# Patient Record
Sex: MALE | Race: WHITE | NOT HISPANIC OR LATINO | Employment: OTHER | URBAN - METROPOLITAN AREA
[De-identification: names, ages, dates, MRNs, and addresses within clinical notes are randomized per-mention and may not be internally consistent; named-entity substitution may affect disease eponyms.]

---

## 2017-05-01 ENCOUNTER — OFFICE VISIT (OUTPATIENT)
Dept: FAMILY MEDICINE | Facility: CLINIC | Age: 67
End: 2017-05-01
Payer: COMMERCIAL

## 2017-05-01 VITALS
WEIGHT: 229 LBS | SYSTOLIC BLOOD PRESSURE: 136 MMHG | BODY MASS INDEX: 31.02 KG/M2 | HEART RATE: 73 BPM | DIASTOLIC BLOOD PRESSURE: 80 MMHG | HEIGHT: 72 IN | TEMPERATURE: 98 F | OXYGEN SATURATION: 100 %

## 2017-05-01 DIAGNOSIS — I10 ESSENTIAL HYPERTENSION WITH GOAL BLOOD PRESSURE LESS THAN 140/90: ICD-10-CM

## 2017-05-01 DIAGNOSIS — Z13.220 SCREENING CHOLESTEROL LEVEL: ICD-10-CM

## 2017-05-01 DIAGNOSIS — Z12.11 SPECIAL SCREENING FOR MALIGNANT NEOPLASMS, COLON: ICD-10-CM

## 2017-05-01 DIAGNOSIS — R97.20 ELEVATED PSA: Primary | ICD-10-CM

## 2017-05-01 LAB
ANION GAP SERPL CALCULATED.3IONS-SCNC: 7 MMOL/L (ref 3–14)
BUN SERPL-MCNC: 15 MG/DL (ref 7–30)
CALCIUM SERPL-MCNC: 9 MG/DL (ref 8.5–10.1)
CHLORIDE SERPL-SCNC: 108 MMOL/L (ref 94–109)
CHOLEST SERPL-MCNC: 175 MG/DL
CO2 SERPL-SCNC: 27 MMOL/L (ref 20–32)
CREAT SERPL-MCNC: 1.09 MG/DL (ref 0.66–1.25)
GFR SERPL CREATININE-BSD FRML MDRD: 67 ML/MIN/1.7M2
GLUCOSE SERPL-MCNC: 100 MG/DL (ref 70–99)
HDLC SERPL-MCNC: 48 MG/DL
LDLC SERPL CALC-MCNC: 106 MG/DL
NONHDLC SERPL-MCNC: 127 MG/DL
POTASSIUM SERPL-SCNC: 4.2 MMOL/L (ref 3.4–5.3)
PSA SERPL-MCNC: 5.05 UG/L (ref 0–4)
SODIUM SERPL-SCNC: 142 MMOL/L (ref 133–144)
TRIGL SERPL-MCNC: 103 MG/DL

## 2017-05-01 PROCEDURE — 36415 COLL VENOUS BLD VENIPUNCTURE: CPT | Performed by: FAMILY MEDICINE

## 2017-05-01 PROCEDURE — 99214 OFFICE O/P EST MOD 30 MIN: CPT | Performed by: FAMILY MEDICINE

## 2017-05-01 PROCEDURE — 84153 ASSAY OF PSA TOTAL: CPT | Performed by: FAMILY MEDICINE

## 2017-05-01 PROCEDURE — 80061 LIPID PANEL: CPT | Performed by: FAMILY MEDICINE

## 2017-05-01 PROCEDURE — 80048 BASIC METABOLIC PNL TOTAL CA: CPT | Performed by: FAMILY MEDICINE

## 2017-05-01 RX ORDER — AMLODIPINE BESYLATE 10 MG/1
10 TABLET ORAL DAILY
Qty: 90 TABLET | Refills: 1 | Status: SHIPPED | OUTPATIENT
Start: 2017-05-01 | End: 2017-11-30

## 2017-05-01 NOTE — NURSING NOTE
"Chief Complaint   Patient presents with     Hypertension       Initial /86 (Cuff Size: Adult Large)  Pulse 73  Temp 98  F (36.7  C) (Oral)  Ht 6' 0.25\" (1.835 m)  Wt 229 lb (103.9 kg)  SpO2 100%  BMI 30.84 kg/m2 Estimated body mass index is 30.84 kg/(m^2) as calculated from the following:    Height as of this encounter: 6' 0.25\" (1.835 m).    Weight as of this encounter: 229 lb (103.9 kg).  Medication Reconciliation: complete    Lauren Rod LPN    "

## 2017-05-01 NOTE — PATIENT INSTRUCTIONS
1. Set up the colonoscopy by calling [phone].    2. Let me see you back in 6 months for a physical with fasting labs.

## 2017-05-01 NOTE — PROGRESS NOTES
HPI:    Duane is a 65 year old male here to discuss:    HTN - Dx'd around: 2010. BP checks outside the clinic: about once per week - around 130/70  Treatment:    Norvasc 10 mg qd - no side effects   Lisinopril, HCTZ, Losartan caused cough and dry skin.  Compliant: yes  Side effects: trace ankle swelling but tolerable  Controlled: yes  Plan for today: Continue Norvasc 10 mg qPM.   Counseling done today: Advised to avoid excessive salt. Regular exercise, following the DASH diet and trying to achieve healthy weight are all important.    Last Basic Metabolic Panel:  NA      141   1/20/2016   POTASSIUM      4.2   1/20/2016  CHLORIDE      108   1/20/2016  JUSTIN      8.6   1/20/2016  CO2       28   1/20/2016  BUN       14   1/20/2016  CR     1.15   1/20/2016  GLC       97   1/20/2016    CBC RESULTS:   Recent Labs   Lab Test  01/20/16   0947   WBC  6.9   RBC  4.81   HGB  14.5   HCT  41.7   MCV  87   MCH  30.1   MCHC  34.8   RDW  12.8   PLT  209     Obesity - declined referral to nutrition. Diet and exercise discussed.    TSH     Date   Value   Ref Range   Status     2/3/2015   1.56    0.40 - 4.00 mU/L   Final     Effective 7/30/2014, the reference range for this assay has changed to reflect  new instrumentation/methodology.      Wt Readings from Last 5 Encounters:   05/01/17 229 lb (103.9 kg)   07/26/16 223 lb (101.2 kg)   02/17/16 220 lb (99.8 kg)   01/20/16 217 lb (98.4 kg)   07/29/15 219 lb (99.3 kg)     Chronic nasal obstruction - he tells me he saw an ENT doctor many years ago and was told all was well.      Elevated PSA - recheck today.    PSA   Date Value Ref Range Status   07/26/2016 4.26 (H) 0 - 4 ug/L Final     Preventive -     Declines flu shot, Tdap, Prevnar, Shingles and Pneumovax.    Lipids screen:    Recent Labs   Lab Test  01/20/16   0947  02/03/15   0755  07/01/13   0937   CHOL  153  152  154   HDL  39*  37*  37*   LDL  82  91  97   TRIG  159*  120  101   CHOLHDLRATIO   --   4.1  4.1     Colonoscopy: 6/5/07  "normal per patient.    Advanced direction: referred previously    ROS:    Const: No fevers or night sweats recently.  ENT: No sore throat or ear pain.  Resp: No cough or shortness of breath.  CV: No chest pain, dizziness or cardiac palpitations.  GI: No nausea, vomiting, diarrhea or constipation. Denies blood in stools or black stools.  : No dysuria, frequency or hematuria.    SH:    Marital status:   Kids: 4  Employment: retired  Exercise: walks about 2 miles every other day. Not going to Gym any more. But golfing 4-5 per week.   Tobacco: no  Etoh: 3-4 per week  Recreational drugs: no  Caffeine: coffee 2 cups per day.    Exam:    /80  Pulse 73  Temp 98  F (36.7  C) (Oral)  Ht 6' 0.25\" (1.835 m)  Wt 229 lb (103.9 kg)  SpO2 100%  BMI 30.84 kg/m2    Gen: Healthy appearing male in no acute distress  Eyes: Conjunctiva and sclera normal. Pupils react normally to light. No nystagmus.  Neck: No enlarged lymph nodes, thyromegally or other masses.  Lungs: Good air movement and otherwise clear.  CV: Heart RRR with no murmurs. No JVD, carotid bruits or leg edema.      Assessment and Plan - Decision Making    1. Elevated PSA  Repeat today.  - PSA, tumor marker    2. Essential hypertension with goal blood pressure less than 140/90  Controlled. Continue same treatment. Check fasting labs.  - Basic metabolic panel  - amLODIPine (NORVASC) 10 MG tablet; Take 1 tablet (10 mg) by mouth daily  Dispense: 90 tablet; Refill: 1    3. Screening cholesterol level  Check fasting labs.  - Lipid panel reflex to direct LDL    4. Special screening for malignant neoplasms, colon  Referred.  - GASTROENTEROLOGY ADULT REF PROCEDURE ONLY      Written instructions given as follows:    Patient Instructions   1. Set up the colonoscopy by calling [phone].    2. Let me see you back in 6 months for a physical with fasting labs.      "

## 2017-05-01 NOTE — MR AVS SNAPSHOT
"              After Visit Summary   5/1/2017    Duane T Nienow    MRN: [id number]           Patient Information     Date Of Birth          1950        Visit Information        Provider Department      5/1/2017 8:50 AM Srinivasa Berumen MD [facility]        Today's Diagnoses     Elevated PSA    -  1    Essential hypertension with goal blood pressure less than 140/90        Screening cholesterol level        Special screening for malignant neoplasms, colon          Care Instructions    1. Set up the colonoscopy by calling [phone].    2. Let me see you back in 6 months for a physical with fasting labs.        Follow-ups after your visit        Additional Services     GASTROENTEROLOGY ADULT REF PROCEDURE ONLY                 Who to contact     If you have questions or need follow up information about today's clinic visit or your schedule please contact [facility] directly at [phone].  Normal or non-critical lab and imaging results will be communicated to you by Knginehart, letter or phone within 4 business days after the clinic has received the results. If you do not hear from us within 7 days, please contact the clinic through Knginehart or phone. If you have a critical or abnormal lab result, we will notify you by phone as soon as possible.  Submit refill requests through [employer] or call your pharmacy and they will forward the refill request to us. Please allow 3 business days for your refill to be completed.          Additional Information About Your Visit        MyChart Information     [employer] lets you send messages to your doctor, view your test results, renew your prescriptions, schedule appointments and more. To sign up, go to [email or web address] . Click on \"Log in\" on the left side of the screen, which will take you to the Welcome page. Then click on \"Sign up Now\" on the right side of the page.     You will be asked to enter the access code listed below, as well as some " "personal information. Please follow the directions to create your username and password.     Your access code is: [email or web address]  Expires: 2017  9:37 AM     Your access code will  in 90 days. If you need help or a new code, please call your [place] clinic or [phone].        Care EveryWhere ID     This is your Care EveryWhere ID. This could be used by other organizations to access your [facility] medical records  [id number]        Your Vitals Were     Pulse Temperature Height Pulse Oximetry BMI (Body Mass Index)       73 98  F (36.7  C) (Oral) 6' 0.25\" (1.835 m) 100% 30.84 kg/m2        Blood Pressure from Last 3 Encounters:   17 136/80   16 136/79   16 129/82    Weight from Last 3 Encounters:   17 229 lb (103.9 kg)   16 223 lb (101.2 kg)   16 220 lb (99.8 kg)              We Performed the Following     Basic metabolic panel     GASTROENTEROLOGY ADULT REF PROCEDURE ONLY     Lipid panel reflex to direct LDL     PSA, tumor marker          Where to get your medicines      These medications were sent to [address]  [id number] [place] [address]     Phone:  [phone]     amLODIPine 10 MG tablet          Primary Care Provider Office Phone # Fax #    Srinivasa Berumen [phone]       [address] [address]        Thank you!     Thank you for choosing [facility]  for your care. Our goal is always to provide you with excellent care. Hearing back from our patients is one way we can continue to improve our services. Please take a few minutes to complete the written survey that you may receive in the mail after your visit with us. Thank you!             Your Updated Medication List - Protect others around you: Learn how to safely use, store and throw away your medicines at www.disposemymeds.org.          This list is accurate as of: " 5/1/17  9:37 AM.  Always use your most recent med list.                   Brand Name Dispense Instructions for use    amLODIPine 10 MG tablet    NORVASC    90 tablet    Take 1 tablet (10 mg) by mouth daily       aspirin 81 MG tablet      Take  by mouth daily.       MULTI-VITAMIN DAILY PO      Take  by mouth.

## 2017-05-03 ENCOUNTER — TELEPHONE (OUTPATIENT)
Dept: FAMILY MEDICINE | Facility: CLINIC | Age: 67
End: 2017-05-03

## 2017-05-03 DIAGNOSIS — E78.5 DYSLIPIDEMIA: ICD-10-CM

## 2017-05-03 DIAGNOSIS — R97.20 ELEVATED PSA: Primary | ICD-10-CM

## 2017-05-03 RX ORDER — ATORVASTATIN CALCIUM 20 MG/1
20 TABLET, FILM COATED ORAL DAILY
Qty: 90 TABLET | Refills: 3 | Status: SHIPPED | OUTPATIENT
Start: 2017-05-03 | End: 2017-10-31

## 2017-05-03 NOTE — TELEPHONE ENCOUNTER
Notes Recorded by Srinivasa Berumen MD on 5/3/2017 at 3:53 PM  Hello Duane,    Your cholesterol was a bit high. Your estimated 10 year risk of a heart attack or stroke is 17.7%. Statin drugs are recommended at 7.5% or greater.     Therefore, I would like you to start Lipitor 20 mg daily (sent to your pharmacy). Possible side effects include liver enzyme elevation, muscle aches. Increase in glucose and memory problems have been reported. But I believe the benefits outweigh the risks.    I recommend including veggies, fresh fruits (3 to 5 servings), nuts (unsalted) in your daily diet. Cut down on carbs like bread, pasta, rice, potatoes. Cut down on red meats like burgers etc. Increase healthy proteins like beans, tofu, tuna, chicken and turkey.    Get regular exercise like jogging, biking, swimming at least 3 times per week.     Your PSA was slightly higher. I am referring you to urology - please set this up by calling [phone].    Other labs were fine.    See you in 6 months as we discussed in clinic.    Regards,    Srinivasa Berumen M.D.

## 2017-05-03 NOTE — PROGRESS NOTES
Hello Duane,    Your cholesterol was a bit high. Your estimated 10 year risk of a heart attack or stroke is 17.7%. Statin drugs are recommended at 7.5% or greater.     Therefore, I would like you to start Lipitor 20 mg daily (sent to your pharmacy). Possible side effects include liver enzyme elevation, muscle aches. Increase in glucose and memory problems have been reported. But I believe the benefits outweigh the risks.    I recommend including veggies, fresh fruits (3 to 5 servings), nuts (unsalted) in your daily diet. Cut down on carbs like bread, pasta, rice, potatoes. Cut down on red meats like burgers etc. Increase healthy proteins like beans, tofu, tuna, chicken and turkey.    Get regular exercise like jogging, biking, swimming at least 3 times per week.     Your PSA was slightly higher. I am referring you to urology - please set this up by calling [phone].    Other labs were fine.    See you in 6 months as we discussed in clinic.    Regards,    Srinivasa Berumen M.D.

## 2017-05-03 NOTE — LETTER
[facility]  [address] [facility] [address] [phone]        May 4, 2017    Duane T Nienow  [address] [facility] [address]            Denise Aliciaane,    Your cholesterol was a bit high. Your estimated 10 year risk of a heart attack or stroke is 17.7%. Statin drugs are recommended at 7.5% or greater.     Therefore, I would like you to start Lipitor 20 mg daily (sent to your pharmacy). Possible side effects include liver enzyme elevation, muscle aches. Increase in glucose and memory problems have been reported. But I believe the benefits outweigh the risks.    I recommend including veggies, fresh fruits (3 to 5 servings), nuts (unsalted) in your daily diet. Cut down on carbs like bread, pasta, rice, potatoes. Cut down on red meats like burgers etc. Increase healthy proteins like beans, tofu, tuna, chicken and turkey.    Get regular exercise like jogging, biking, swimming at least 3 times per week.     Your PSA was slightly higher. I am referring you to urology - please set this up by calling [phone].    Other labs were fine.    See you in 6 months as we discussed in clinic.    Regards,    Srinivasa Berumen M.D.    Results for orders placed or performed in visit on 05/01/17   Lipid panel reflex to direct LDL   Result Value Ref Range    Cholesterol 175 <200 mg/dL    Triglycerides 103 <150 mg/dL    HDL Cholesterol 48 >39 mg/dL    LDL Cholesterol Calculated 106 (H) <100 mg/dL    Non HDL Cholesterol 127 <130 mg/dL   PSA, tumor marker   Result Value Ref Range    PSA 5.05 (H) 0 - 4 ug/L   Basic metabolic panel   Result Value Ref Range    Sodium 142 133 - 144 mmol/L    Potassium 4.2 3.4 - 5.3 mmol/L    Chloride 108 94 - 109 mmol/L    Carbon Dioxide 27 20 - 32 mmol/L    Anion Gap 7 3 - 14 mmol/L    Glucose 100 (H) 70 - 99 mg/dL    Urea Nitrogen 15 7 - 30 mg/dL    Creatinine 1.09 0.66 - 1.25 mg/dL    GFR Estimate 67 >60 mL/min/1.7m2    GFR Estimate If Black 82 >60 mL/min/1.7m2    Calcium  9.0 8.5 - 10.1 mg/dL

## 2017-05-04 NOTE — TELEPHONE ENCOUNTER
Pt notified of provider message as written.  Pt verbalized good understanding.  Pt requesting paper copy of results and note be mailed to his home.  Mckenzie Patel RN

## 2017-09-07 ENCOUNTER — TELEPHONE (OUTPATIENT)
Dept: FAMILY MEDICINE | Facility: CLINIC | Age: 67
End: 2017-09-07

## 2017-09-07 NOTE — LETTER
Duane T Nienow                                                                [address]  [place] [address]              September 7, 2017             Our records indicate that you have not scheduled for a(n)appointment with  Srinivasa Berumen MD and fasting bloodwork one week prior to the appointment with the provider which was recommended by your health care team. Monitoring and managing your preventative and chronic health conditions are very important to us.     Set up the colonoscopy by calling [phone].    Please call [phone] or message us through your Sc[employer]led[employer] account to schedule an appointment or provide information for your chart.     If you are receiving any of these services at another site please bring in a copy at your next visit so we can get it scanned into your chart.     I look forward to seeing you and working with you on your health care needs.     Sincerely,       Srinivasa Berumen MD/sw

## 2017-09-07 NOTE — TELEPHONE ENCOUNTER
Panel Management Review      Patient has the following on his problem list:     Hypertension   Last three blood pressure readings:  BP Readings from Last 3 Encounters:   05/01/17 136/80   07/26/16 136/79   02/17/16 129/82     Blood pressure: Passed    HTN Guidelines:  Age 18-59 BP range:  Less than 140/90  Age 60-85 with Diabetes:  Less than 140/90  Age 60-85 without Diabetes:  less than 150/90        Composite cancer screening  Chart review shows that this patient is due/due soon for the following Colonoscopy  Summary:    Patient is due/failing the following:   COLONOSCOPY    Action needed:   Patient needs office visit for a physical with fasting labs.    Type of outreach:    Sent letter.    Questions for provider review:    None                                                                                                                                    Lauren Rod LPN       Chart routed to Care Team .

## 2017-10-31 ENCOUNTER — OFFICE VISIT (OUTPATIENT)
Dept: UROLOGY | Facility: CLINIC | Age: 67
End: 2017-10-31
Payer: COMMERCIAL

## 2017-10-31 VITALS — DIASTOLIC BLOOD PRESSURE: 88 MMHG | SYSTOLIC BLOOD PRESSURE: 132 MMHG | RESPIRATION RATE: 14 BRPM | HEART RATE: 77 BPM

## 2017-10-31 DIAGNOSIS — R97.20 ELEVATED PSA: Primary | ICD-10-CM

## 2017-10-31 PROCEDURE — 87070 CULTURE OTHR SPECIMN AEROBIC: CPT | Performed by: UROLOGY

## 2017-10-31 PROCEDURE — 87077 CULTURE AEROBIC IDENTIFY: CPT | Performed by: UROLOGY

## 2017-10-31 PROCEDURE — 87186 SC STD MICRODIL/AGAR DIL: CPT | Performed by: UROLOGY

## 2017-10-31 PROCEDURE — 99203 OFFICE O/P NEW LOW 30 MIN: CPT | Performed by: UROLOGY

## 2017-10-31 NOTE — PATIENT INSTRUCTIONS
Prostate Needle Biopsy    Prostate needle biopsy is a test to look for prostate cancer. During the test, a thin, hollow needle is used to take small samples of tissue from the prostate. The samples are then tested in a lab.  Getting ready for the procedure  Prepare as you have been told. In addition to the following:    Tell your healthcare provider about all medicines you take. This includes herbs and other supplements. It also includes any blood thinners, such as warfarin, clopidogrel, or daily aspirin. You may need to stop taking some or all of them before the procedure.    You may be told to use a laxative, enemas, or both before the biopsy. This is to empty the colon and rectum of stool. Follow the instructions you are given.    Your healthcare provider may prescribe antibiotics before the procedure. If so, take these as directed.  Risks and possible complications   All procedures have risks. The risks of this procedure include:    Discomfort in the prostate area    Infection in the urinary tract or prostate    Infection in the bloodstream    Rectal or urinary bleeding   The day of the procedure  The procedure is done in a healthcare provider s office or a hospital. It takes about 45 minutes. You will be able to go home the same day. Transrectal ultrasound is often used during the procedure. This test uses sound waves to make images on a computer screen. The images help the healthcare provider insert the needle in the correct place. During the biopsy:    If ultrasound will be used, you may be asked to drink water to fill your bladder.      You may lie on your side on an exam table.     The ultrasound transducer, which is about the size of a finger, is lubricated. It is then inserted into the rectum. This will feel like a prostate exam. The transducer is moved until the prostate can be seen in the ultrasound images.      To numb the biopsy area, local anesthetics may be injected. You might also be given  medicine to make you sleepy.    Using the ultrasound images as a guide, the biopsy needle is inserted. It may be inserted through the rectum or through the skin between the scrotum and the anus (perineum).    The needle is used to take tissue samples from the prostate. About 12 samples are taken from different areas of the prostate. These samples are sent to a lab to be tested for cancer.  After the biopsy  At first you may feel a little lightheaded, especially if you had medicine to make you sleepy. You can lie on the table until you feel able to stand. You can go home once you are feeling better. You can go back to your normal activities. You may have some blood in your urine or stool that day. This is normal. You may also notice blood in your semen for weeks after the biopsy. This is normal and not dangerous. Your healthcare provider can tell you more about what to expect.  Follow-up care  You will see your healthcare provider for a follow-up visit. Depending on the biopsy results, you may be scheduled for more tests. If signs of cancer are found, you and your healthcare provider can discuss options for further testing.  When to call your healthcare provider  Call your healthcare provider if you have any of the following:    Blood clots in your urine    Bloody diarrhea    Blood in the urine or stool that doesn t go away after 48 hours    Chest pain or trouble breathing (call 911)    Chills    Feeling of weakness    Fever of 100.4 F (38 C) or higher, or as directed by your healthcare provider    Inability to urinate   Date Last Reviewed: 5/1/2017 2000-2017 [employer]. [address] [place] [address], [place] [address]. All rights reserved. This information is not intended as a substitute for professional medical care. Always follow your healthcare professional's instructions.

## 2017-10-31 NOTE — PROGRESS NOTES
S: Duane T Nienow is a pleasant  66 year old male who was requested to be seen by  Srinivasa Berumen for a consult with regard to patient's elevated PSA.  His recent PSA was found to be   PSA   Date Value Ref Range Status   05/01/2017 5.05 (H) 0 - 4 ug/L Final     Comment:     Assay Method:  Chemiluminescence using Siemens Vista analyzer   .  His previous PSA was abnormal in 2016 also.  Patient complains of minimal voiding symptoms.  He has no history of elevated PSA.  His AUA Symptom Score:  low.  Current Outpatient Prescriptions   Medication Sig Dispense Refill     amLODIPine (NORVASC) 10 MG tablet Take 1 tablet (10 mg) by mouth daily 90 tablet 1     aspirin 81 MG tablet Take  by mouth daily.       Multiple Vitamin (MULTI-VITAMIN DAILY PO) Take  by mouth.        No Known Allergies   Past Medical History:   Diagnosis Date     Hypertension      Past Surgical History:   Procedure Laterality Date     ORTHOPEDIC SURGERY      michell. bunionectomy      Family History   Problem Relation Age of Onset     CEREBROVASCULAR DISEASE Father      HEART DISEASE Father      MI age 49     He does not have a family history of prostate cancer.  Social History     Social History     Marital status:      Spouse name: N/A     Number of children: N/A     Years of education: N/A     Occupational History     Not on file.     Social History Main Topics     Smoking status: Former Smoker     Quit date: 6/5/1972     Smokeless tobacco: Never Used     Alcohol use Yes      Comment: 3 drinks a week     Drug use: No     Sexual activity: Yes     Other Topics Concern     Not on file     Social History Narrative        REVIEW OF SYSTEMS  =================  C: NEGATIVE for fever, chills, change in weight  I: NEGATIVE for worrisome rashes, moles or lesions  E/M: NEGATIVE for ear, mouth and throat problems  R: NEGATIVE for significant cough or SHORTNESS OF BREATH  CV:  NEGATIVE for chest pain, palpitations or peripheral edema  GI: NEGATIVE for nausea,  abdominal pain, heartburn, or change in bowel habits  NEURO: NEGATIVE  PSYCH: NEGATIVE    Physical Exam:  /88 (BP Location: Right arm, Patient Position: Chair, Cuff Size: Adult Regular)  Pulse 77  Resp 14   Patient is pleasant, in no acute distress, good general condition.  HEENT normocephalic, atraumatic  Lung: no evidence of respiratory distress    Abdomen: Soft, nondistended, non tender. No masses. No rebound or guarding.   Exam: penis no d/c.  Left testis small.  Right missing.  Prostate 30 gm smooth no nodule.  Skin: Warm and dry.  No redness.  Neuro non focal  Psych normal mood and affect    Assessment/Plan:   (R97.20) Elevated PSA  (primary encounter diagnosis)  Comment: discussed psa elevation/prostate cancer  Plan: schedule for trus and biopsy            Risks of bleeding/infection discussed.

## 2017-10-31 NOTE — NURSING NOTE
"Chief Complaint   Patient presents with     Elevated PSA       Initial /88 (BP Location: Right arm, Patient Position: Chair, Cuff Size: Adult Regular)  Pulse 77  Resp 14 Estimated body mass index is 30.84 kg/(m^2) as calculated from the following:    Height as of 5/1/17: 1.835 m (6' 0.25\").    Weight as of 5/1/17: 103.9 kg (229 lb).  Medication Reconciliation: complete   Ladi Cosme RN       "

## 2017-10-31 NOTE — MR AVS SNAPSHOT
After Visit Summary   10/31/2017    Duane T Nienow    MRN: [id number]           Patient Information     Date Of Birth          1950        Visit Information        Provider Department      10/31/2017 8:15 AM Andrea Bonner MD [facility]        Today's Diagnoses     Elevated PSA    -  1      Care Instructions      Prostate Needle Biopsy    Prostate needle biopsy is a test to look for prostate cancer. During the test, a thin, hollow needle is used to take small samples of tissue from the prostate. The samples are then tested in a lab.  Getting ready for the procedure  Prepare as you have been told. In addition to the following:    Tell your healthcare provider about all medicines you take. This includes herbs and other supplements. It also includes any blood thinners, such as warfarin, clopidogrel, or daily aspirin. You may need to stop taking some or all of them before the procedure.    You may be told to use a laxative, enemas, or both before the biopsy. This is to empty the colon and rectum of stool. Follow the instructions you are given.    Your healthcare provider may prescribe antibiotics before the procedure. If so, take these as directed.  Risks and possible complications   All procedures have risks. The risks of this procedure include:    Discomfort in the prostate area    Infection in the urinary tract or prostate    Infection in the bloodstream    Rectal or urinary bleeding   The day of the procedure  The procedure is done in a healthcare provider s office or a hospital. It takes about 45 minutes. You will be able to go home the same day. Transrectal ultrasound is often used during the procedure. This test uses sound waves to make images on a computer screen. The images help the healthcare provider insert the needle in the correct place. During the biopsy:    If ultrasound will be used, you may be asked to drink water to fill your bladder.      You may lie on your side  on an exam table.     The ultrasound transducer, which is about the size of a finger, is lubricated. It is then inserted into the rectum. This will feel like a prostate exam. The transducer is moved until the prostate can be seen in the ultrasound images.      To numb the biopsy area, local anesthetics may be injected. You might also be given medicine to make you sleepy.    Using the ultrasound images as a guide, the biopsy needle is inserted. It may be inserted through the rectum or through the skin between the scrotum and the anus (perineum).    The needle is used to take tissue samples from the prostate. About 12 samples are taken from different areas of the prostate. These samples are sent to a lab to be tested for cancer.  After the biopsy  At first you may feel a little lightheaded, especially if you had medicine to make you sleepy. You can lie on the table until you feel able to stand. You can go home once you are feeling better. You can go back to your normal activities. You may have some blood in your urine or stool that day. This is normal. You may also notice blood in your semen for weeks after the biopsy. This is normal and not dangerous. Your healthcare provider can tell you more about what to expect.  Follow-up care  You will see your healthcare provider for a follow-up visit. Depending on the biopsy results, you may be scheduled for more tests. If signs of cancer are found, you and your healthcare provider can discuss options for further testing.  When to call your healthcare provider  Call your healthcare provider if you have any of the following:    Blood clots in your urine    Bloody diarrhea    Blood in the urine or stool that doesn t go away after 48 hours    Chest pain or trouble breathing (call 911)    Chills    Feeling of weakness    Fever of 100.4 F (38 C) or higher, or as directed by your healthcare provider    Inability to urinate   Date Last Reviewed: 5/1/2017 2000-2017 [facility]  [employer]. [address] [place] [address]. All rights reserved. This information is not intended as a substitute for professional medical care. Always follow your healthcare professional's instructions.                Follow-ups after your visit        Your next 10 appointments already scheduled     Nov 07, 2017   Procedure with Graham Cerda MD   [facility] [place] (--)    [address] [place] [address]   [phone]            Nov 21, 2017  8:00 AM CST   US PROSTATE WITH BIOPSY with FKUS1   [facility] ([facility])    [address] [place] [address] [phone]           Please bring a list of your medicines (including vitamins, minerals and over-the-counter drugs). Also, tell your doctor about any allergies you may have. Wear comfortable clothes and leave your valuables at home.  Take a Fleet enema two hours before your exam. Buy this at the drug store. Follow the instructions on the box.  Please bring or send the results of your most recent PSA test, along with the written report from your last rectal or prostate exam.  Please call the Imaging Department at your exam site with any questions.            Nov 21, 2017  8:15 AM CST   Return Visit with Andrea Bonner MD   [facility] ([facility])    [address] [place] [address]   [phone]            Nov 28, 2017  8:00 AM CST   Return Visit with Andrea Bonner MD   [facility] ([facility])    [address] [place] [address]   [phone]              Who to contact     If you have questions or need follow up information about today's clinic visit or your schedule please contact [facility] directly at [phone].  Normal or non-critical lab and imaging results will be communicated to you by MyChart, letter or phone within 4 business days after the clinic has received  "the results. If you do not hear from us within 7 days, please contact the clinic through [employer] or phone. If you have a critical or abnormal lab result, we will notify you by phone as soon as possible.  Submit refill requests through [employer] or call your pharmacy and they will forward the refill request to us. Please allow 3 business days for your refill to be completed.          Additional Information About Your Visit        [employer] Information     [employer] lets you send messages to your doctor, view your test results, renew your prescriptions, schedule appointments and more. To sign up, go to [email or web address] . Click on \"Log in\" on the left side of the screen, which will take you to the Welcome page. Then click on \"Sign up Now\" on the right side of the page.     You will be asked to enter the access code listed below, as well as some personal information. Please follow the directions to create your username and password.     Your access code is: [email or web address]  Expires: 2018  8:31 AM     Your access code will  in 90 days. If you need help or a new code, please call your [place] clinic or [phone].        Care EveryWhere ID     This is your Care EveryWhere ID. This could be used by other organizations to access your [place] medical records  [id number]        Your Vitals Were     Pulse Respirations                77 14           Blood Pressure from Last 3 Encounters:   10/31/17 132/88   17 136/80   16 136/79    Weight from Last 3 Encounters:   17 103.9 kg (229 lb)   16 101.2 kg (223 lb)   16 99.8 kg (220 lb)              Today, you had the following     No orders found for display       Primary Care Provider Office Phone # Fax #    Srinivasa Berumen [phone] [address] [facility] [address]        Equal Access to Services     SHELLY CHRISTIAN : Hadii wilder Rhoades, waaxda luqadaha, qaybta jordanalaicha vail, benji juarez " unique brandtbettinajose juarezSuhailaaplacido ah. So [facility] [phone].    ATENCIÓN: Si habla alex, tiene a coto disposición servicios gratuitos de asistencia lingüística. Mikala al [phone].    We comply with applicable federal civil rights laws and [place] laws. We do not discriminate on the basis of race, color, national origin, age, disability, sex, sexual orientation, or gender identity.            Thank you!     Thank you for choosing [facility]  for your care. Our goal is always to provide you with excellent care. Hearing back from our patients is one way we can continue to improve our services. Please take a few minutes to complete the written survey that you may receive in the mail after your visit with us. Thank you!             Your Updated Medication List - Protect others around you: Learn how to safely use, store and throw away your medicines at www.disposemymeds.org.          This list is accurate as of: 10/31/17  8:42 AM.  Always use your most recent med list.                   Brand Name Dispense Instructions for use Diagnosis    amLODIPine 10 MG tablet    NORVASC    90 tablet    Take 1 tablet (10 mg) by mouth daily    Essential hypertension with goal blood pressure less than 140/90       aspirin 81 MG tablet      Take  by mouth daily.        MULTI-VITAMIN DAILY PO      Take  by mouth.

## 2017-11-03 DIAGNOSIS — R97.20 ELEVATED PSA: Primary | ICD-10-CM

## 2017-11-03 LAB
BACTERIA SPEC CULT: ABNORMAL
Lab: ABNORMAL
SPECIMEN SOURCE: ABNORMAL

## 2017-11-03 RX ORDER — CIPROFLOXACIN 500 MG/1
500 TABLET, FILM COATED ORAL 2 TIMES DAILY
Qty: 6 TABLET | Refills: 0 | Status: SHIPPED | OUTPATIENT
Start: 2017-11-03 | End: 2017-11-06

## 2017-11-03 RX ORDER — CEPHALEXIN 500 MG/1
500 CAPSULE ORAL 3 TIMES DAILY
Qty: 9 CAPSULE | Refills: 0 | Status: SHIPPED | OUTPATIENT
Start: 2017-11-03 | End: 2017-11-06

## 2017-11-07 ENCOUNTER — SURGERY (OUTPATIENT)
Age: 67
End: 2017-11-07

## 2017-11-07 ENCOUNTER — HOSPITAL ENCOUNTER (OUTPATIENT)
Facility: AMBULATORY SURGERY CENTER | Age: 67
Discharge: HOME | End: 2017-11-07
Attending: SURGERY | Admitting: SURGERY
Payer: COMMERCIAL

## 2017-11-07 VITALS
TEMPERATURE: 98.8 F | RESPIRATION RATE: 18 BRPM | OXYGEN SATURATION: 94 % | DIASTOLIC BLOOD PRESSURE: 67 MMHG | SYSTOLIC BLOOD PRESSURE: 102 MMHG

## 2017-11-07 PROCEDURE — 45381 COLONOSCOPY SUBMUCOUS NJX: CPT | Mod: PT | Performed by: SURGERY

## 2017-11-07 PROCEDURE — 45385 COLONOSCOPY W/LESION REMOVAL: CPT

## 2017-11-07 PROCEDURE — G0500 MOD SEDAT ENDO SERVICE >5YRS: HCPCS | Performed by: SURGERY

## 2017-11-07 PROCEDURE — 88305 TISSUE EXAM BY PATHOLOGIST: CPT | Performed by: SURGERY

## 2017-11-07 PROCEDURE — G8907 PT DOC NO EVENTS ON DISCHARG: HCPCS

## 2017-11-07 PROCEDURE — 45385 COLONOSCOPY W/LESION REMOVAL: CPT | Mod: PT | Performed by: SURGERY

## 2017-11-07 PROCEDURE — 45381 COLONOSCOPY SUBMUCOUS NJX: CPT

## 2017-11-07 PROCEDURE — G8918 PT W/O PREOP ORDER IV AB PRO: HCPCS

## 2017-11-07 RX ORDER — FENTANYL CITRATE 50 UG/ML
INJECTION, SOLUTION INTRAMUSCULAR; INTRAVENOUS PRN
Status: DISCONTINUED | OUTPATIENT
Start: 2017-11-07 | End: 2017-11-07 | Stop reason: HOSPADM

## 2017-11-07 RX ORDER — ONDANSETRON 2 MG/ML
4 INJECTION INTRAMUSCULAR; INTRAVENOUS
Status: DISCONTINUED | OUTPATIENT
Start: 2017-11-07 | End: 2017-11-08 | Stop reason: HOSPADM

## 2017-11-07 RX ORDER — LIDOCAINE 40 MG/G
CREAM TOPICAL
Status: DISCONTINUED | OUTPATIENT
Start: 2017-11-07 | End: 2017-11-08 | Stop reason: HOSPADM

## 2017-11-07 RX ADMIN — FENTANYL CITRATE 50 MCG: 50 INJECTION, SOLUTION INTRAMUSCULAR; INTRAVENOUS at 10:41

## 2017-11-07 RX ADMIN — FENTANYL CITRATE 100 MCG: 50 INJECTION, SOLUTION INTRAMUSCULAR; INTRAVENOUS at 10:39

## 2017-11-10 LAB — COPATH REPORT: NORMAL

## 2017-11-14 LAB — COLONOSCOPY: NORMAL

## 2017-11-21 ENCOUNTER — RADIANT APPOINTMENT (OUTPATIENT)
Dept: ULTRASOUND IMAGING | Facility: CLINIC | Age: 67
End: 2017-11-21
Payer: COMMERCIAL

## 2017-11-21 ENCOUNTER — OFFICE VISIT (OUTPATIENT)
Dept: UROLOGY | Facility: CLINIC | Age: 67
End: 2017-11-21
Payer: COMMERCIAL

## 2017-11-21 DIAGNOSIS — R97.20 ELEVATED PSA: Primary | ICD-10-CM

## 2017-11-21 DIAGNOSIS — R97.20 ELEVATED PSA: ICD-10-CM

## 2017-11-21 PROCEDURE — 99207 ZZC DROP WITH A PROCEDURE: CPT | Mod: 25 | Performed by: UROLOGY

## 2017-11-21 PROCEDURE — 88344 IMHCHEM/IMCYTCHM EA MLT ANTB: CPT | Mod: 59 | Performed by: UROLOGY

## 2017-11-21 PROCEDURE — 99000 SPECIMEN HANDLING OFFICE-LAB: CPT | Performed by: UROLOGY

## 2017-11-21 PROCEDURE — 88305 TISSUE EXAM BY PATHOLOGIST: CPT | Performed by: UROLOGY

## 2017-11-21 PROCEDURE — 76942 ECHO GUIDE FOR BIOPSY: CPT | Performed by: UROLOGY

## 2017-11-21 PROCEDURE — 55700 HC BIOPSY PROSTATE NEEDLE/PUNCH: CPT | Performed by: UROLOGY

## 2017-11-21 NOTE — MR AVS SNAPSHOT
"              After Visit Summary   11/21/2017    Duane T Nienow    MRN: [id number]           Patient Information     Date Of Birth          1950        Visit Information        Provider Department      11/21/2017 8:15 AM Andrea Bonner MD [facility]        Today's Diagnoses     Elevated PSA    -  1       Follow-ups after your visit        Your next 10 appointments already scheduled     Nov 28, 2017  8:00 AM CST   Return Visit with Andrea Bonner MD   [facility] ([address]  [place] [address] [phone]              Who to contact     If you have questions or need follow up information about today's clinic visit or your schedule please contact [facility] directly at [phone].  Normal or non-critical lab and imaging results will be communicated to you by MyChart, letter or phone within 4 business days after the clinic has received the results. If you do not hear from us within 7 days, please contact the clinic through MyChart or phone. If you have a critical or abnormal lab result, we will notify you by phone as soon as possible.  Submit refill requests through [employer] or call your pharmacy and they will forward the refill request to us. Please allow 3 business days for your refill to be completed.          Additional Information About Your Visit        MyChart Information     [employer] lets you send messages to your doctor, view your test results, renew your prescriptions, schedule appointments and more. To sign up, go to [email or web address] . Click on \"Log in\" on the left side of the screen, which will take you to the Welcome page. Then click on \"Sign up Now\" on the right side of the page.     You will be asked to enter the access code listed below, as well as some personal information. Please follow the directions to create your username and password.     Your access code is: [email or web address]  Expires: " 2018  7:31 AM     Your access code will  in 90 days. If you need help or a new code, please call your [place] clinic or [phone].        Care EveryWhere ID     This is your Care EveryWhere ID. This could be used by other organizations to access your [place] medical records  [id number]         Blood Pressure from Last 3 Encounters:   17 102/67   10/31/17 132/88   17 136/80    Weight from Last 3 Encounters:   17 103.9 kg (229 lb)   16 101.2 kg (223 lb)   16 99.8 kg (220 lb)              We Performed the Following     BIOPSY PROSTATE NEEDLE/PUNCH     CL SPECIMEN HANDLING,DR OFF->LAB     Surgical pathology exam        Primary Care Provider Office Phone # Fax #    Srinivasa Berumen [phone] [address] [facility] [address]        Equal Access to Services     SHELLY CHRISTIAN : Hadii aad ku hadasho Soomaali, waaxda luqadaha, qaybta kaalmada adeegyada, waxay cristianin haychiragn unique youngblood . So [facility] [phone].    ATENCIÓN: Si chastityla español, tiene a coto disposición servicios gratuitos de asistencia lingüística. Llame al [phone].    We comply with applicable federal civil rights laws and [place] laws. We do not discriminate on the basis of race, color, national origin, age, disability, sex, sexual orientation, or gender identity.            Thank you!     Thank you for choosing [facility]  for your care. Our goal is always to provide you with excellent care. Hearing back from our patients is one way we can continue to improve our services. Please take a few minutes to complete the written survey that you may receive in the mail after your visit with us. Thank you!             Your Updated Medication List - Protect others around you: Learn how to safely use, store and throw away your medicines at www.disposemymeds.org.          This list is accurate as of: 17  8:26 AM.  Always use your most recent med list.                    Brand Name Dispense Instructions for use Diagnosis    amLODIPine 10 MG tablet    NORVASC    90 tablet    Take 1 tablet (10 mg) by mouth daily    Essential hypertension with goal blood pressure less than 140/90       aspirin 81 MG tablet      Take  by mouth daily.        MULTI-VITAMIN DAILY PO      Take  by mouth.

## 2017-11-21 NOTE — PROGRESS NOTES
Patient is here for prostate biopsy.  He was placed in the dorsal lithotomy position.  Prostate ultrasound placed transrectally.  The neurovascular bundle was anesthetized using 1% lidocaine.  Prostate measurements obtained.  Prostate size is 61 cc.  12 biopsies obtained under guidance of prostate ultrasound using biopsy needle.  Patient tolerated procedure.  Return to clinic in one week for biopsy result.

## 2017-11-23 LAB — COPATH REPORT: NORMAL

## 2017-11-28 ENCOUNTER — OFFICE VISIT (OUTPATIENT)
Dept: UROLOGY | Facility: CLINIC | Age: 67
End: 2017-11-28
Payer: COMMERCIAL

## 2017-11-28 VITALS — SYSTOLIC BLOOD PRESSURE: 136 MMHG | HEART RATE: 68 BPM | DIASTOLIC BLOOD PRESSURE: 84 MMHG | RESPIRATION RATE: 10 BRPM

## 2017-11-28 DIAGNOSIS — R97.20 ELEVATED PSA: Primary | ICD-10-CM

## 2017-11-28 PROCEDURE — 99212 OFFICE O/P EST SF 10 MIN: CPT | Performed by: UROLOGY

## 2017-11-28 NOTE — MR AVS SNAPSHOT
After Visit Summary   11/28/2017    Duane T Nienow    MRN: [id number]           Patient Information     Date Of Birth          1950        Visit Information        Provider Department      11/28/2017 8:00 AM Andrea Bonner MD [facility]        Today's Diagnoses     Elevated PSA    -  1       Follow-ups after your visit        Your next 10 appointments already scheduled     Nov 30, 2017  1:30 PM CST   Office Visit with Srinivasa Berumen MD   [facility]    [address] [facility] [address] [phone]           Bring a current list of meds and any records pertaining to this visit. For Physicals, please bring immunization records and any forms needing to be filled out. Please arrive 10 minutes early to complete paperwork.              Future tests that were ordered for you today     Open Future Orders        Priority Expected Expires Ordered    PSA total and free [TJH501] Routine 11/28/2018 11/28/2018 11/28/2017            Who to contact     If you have questions or need follow up information about today's clinic visit or your schedule please contact [facility] directly at [phone].  Normal or non-critical lab and imaging results will be communicated to you by MyChart, letter or phone within 4 business days after the clinic has received the results. If you do not hear from us within 7 days, please contact the clinic through [employer] or phone. If you have a critical or abnormal lab result, we will notify you by phone as soon as possible.  Submit refill requests through [email or web address] or call your pharmacy and they will forward the refill request to us. Please allow 3 business days for your refill to be completed.          Additional Information About Your Visit        MyChart Information     [email or web address] lets you send messages to your doctor, view your test results, renew your prescriptions, schedule appointments and more. To  "sign up, go to [email or web address] . Click on \"Log in\" on the left side of the screen, which will take you to the Welcome page. Then click on \"Sign up Now\" on the right side of the page.     You will be asked to enter the access code listed below, as well as some personal information. Please follow the directions to create your username and password.     Your access code is: [email or web address]  Expires: 2018  7:31 AM     Your access code will  in 90 days. If you need help or a new code, please call your [place] clinic or [phone].        Care EveryWhere ID     This is your Care EveryWhere ID. This could be used by other organizations to access your [place] medical records  [id number]        Your Vitals Were     Pulse Respirations                68 10           Blood Pressure from Last 3 Encounters:   17 136/84   17 102/67   10/31/17 132/88    Weight from Last 3 Encounters:   17 103.9 kg (229 lb)   16 101.2 kg (223 lb)   16 99.8 kg (220 lb)               Primary Care Provider Office Phone # Fax #    Srinivasa Berumen [phone] [address] [facility] [address]        Equal Access to Services     SHELLY CHRISTIAN : Hadii wilder ku hadasho Soomaali, waaxda luqadaha, qaybta kaalmada genna, benji perez. So [facility] [phone].    ATENCIÓN: Si habla español, tiene a coto disposición servicios gratuitos de asistencia lingüística. Mikala al [phone].    We comply with applicable federal civil rights laws and [place] laws. We do not discriminate on the basis of race, color, national origin, age, disability, sex, sexual orientation, or gender identity.            Thank you!     Thank you for choosing [facility]  for your care. Our goal is always to provide you with excellent care. Hearing back from our patients is one way we can continue to improve our services. Please take a few minutes to complete the written " survey that you may receive in the mail after your visit with us. Thank you!             Your Updated Medication List - Protect others around you: Learn how to safely use, store and throw away your medicines at www.disposemymeds.org.          This list is accurate as of: 11/28/17  8:10 AM.  Always use your most recent med list.                   Brand Name Dispense Instructions for use Diagnosis    amLODIPine 10 MG tablet    NORVASC    90 tablet    Take 1 tablet (10 mg) by mouth daily    Essential hypertension with goal blood pressure less than 140/90       aspirin 81 MG tablet      Take  by mouth daily.        MULTI-VITAMIN DAILY PO      Take  by mouth.

## 2017-11-28 NOTE — PROGRESS NOTES
Chief Complaint   Patient presents with     Results       Duane T Nienow is a 66 year old male who presents today for follow up of   Chief Complaint   Patient presents with     Results    f/u for elevated psa.  He is s/p biopsy.  He is without any complaints.    Current Outpatient Prescriptions   Medication Sig Dispense Refill     amLODIPine (NORVASC) 10 MG tablet Take 1 tablet (10 mg) by mouth daily 90 tablet 1     aspirin 81 MG tablet Take  by mouth daily.       Multiple Vitamin (MULTI-VITAMIN DAILY PO) Take  by mouth.       No Known Allergies   Past Medical History:   Diagnosis Date     Hypertension      Past Surgical History:   Procedure Laterality Date     COLONOSCOPY WITH CO2 INSUFFLATION N/A 11/7/2017    Procedure: COLONOSCOPY WITH CO2 INSUFFLATION;  COLON SCREEN/ OMID;  Surgeon: Graham Cerda MD;  Location: [facility] OR     ORTHOPEDIC SURGERY      michell. bunionectomy     Family History   Problem Relation Age of Onset     CEREBROVASCULAR DISEASE Father      HEART DISEASE Father      MI age 49     Social History     Social History     Marital status:      Spouse name: N/A     Number of children: N/A     Years of education: N/A     Social History Main Topics     Smoking status: Former Smoker     Quit date: 6/5/1972     Smokeless tobacco: Never Used     Alcohol use Yes      Comment: 3 drinks a week     Drug use: No     Sexual activity: Yes     Other Topics Concern     None     Social History Narrative       REVIEW OF SYSTEMS  =================  C: NEGATIVE for fever, chills, change in weight  I: NEGATIVE for worrisome rashes, moles or lesions  E/M: NEGATIVE for ear, mouth and throat problems  R: NEGATIVE for significant cough or SHORTNESS OF BREATH,   CV: NEGATIVE for chest pain, palpitations or peripheral edema  GI: NEGATIVE for nausea, abdominal pain, heartburn, or change in bowel habits  NEURO: NEGATIVE any motor/sensory changes  PSYCH: NEGATIVE for recent mood disorder    Physical Exam:  /84  (BP Location: Right arm, Patient Position: Chair, Cuff Size: Adult Regular)  Pulse 68  Resp 10   Patient is pleasant, in no acute distress, good general condition.  Lung: no evidence of respiratory distress    Abdomen: Soft, nondistended, non tender. No masses. No rebound or guarding.   Exam: normal male  Skin: Warm and dry.  No redness.  Psych: normal mood and affect  Neuro: alert and oriented  Patient Name: NIENOW, DUANE T   MR#: [id number]   Specimen #: [id number]   Collected: 11/21/2017   Received: 11/21/2017   Reported: 11/23/2017 08:59   Ordering Phy(s): FREDDY ADAMS     For improved result formatting, select 'View Enhanced Report Format'   under Linked Documents section.     SPECIMEN(S):   A: Prostate needle biopsy, right   B: Prostate needle biopsy, left     FINAL DIAGNOSIS:   A. Prostate needle biopsy, right:   - Negative for malignancy, benign prostate tissue with mild chronic   prostatitis.     B. Prostate needle biopsy, left:   - Negative for malignancy, benign prostate tissue with mild chronic   prostatitis.     Electronically signed out by:     SHY Alberts M.D.     CLINICAL HISTORY:   66 year old male with elevated PSA.   Assessment/Plan:   (R97.20) Elevated PSA  (primary encounter diagnosis)  Comment: no cancer found  Plan: PSA total and free [YMZ509]          In one year

## 2017-11-28 NOTE — NURSING NOTE
"Chief Complaint   Patient presents with     Results       Initial /84 (BP Location: Right arm, Patient Position: Chair, Cuff Size: Adult Regular)  Pulse 68  Resp 10 Estimated body mass index is 30.84 kg/(m^2) as calculated from the following:    Height as of 5/1/17: 1.835 m (6' 0.25\").    Weight as of 5/1/17: 103.9 kg (229 lb).  Medication Reconciliation: complete   Amanda Thakkar CMA      "

## 2017-11-30 ENCOUNTER — OFFICE VISIT (OUTPATIENT)
Dept: FAMILY MEDICINE | Facility: CLINIC | Age: 67
End: 2017-11-30
Payer: COMMERCIAL

## 2017-11-30 VITALS
BODY MASS INDEX: 30.3 KG/M2 | OXYGEN SATURATION: 98 % | SYSTOLIC BLOOD PRESSURE: 130 MMHG | WEIGHT: 225 LBS | TEMPERATURE: 98.9 F | HEART RATE: 71 BPM | DIASTOLIC BLOOD PRESSURE: 80 MMHG

## 2017-11-30 DIAGNOSIS — E78.5 DYSLIPIDEMIA: Primary | ICD-10-CM

## 2017-11-30 DIAGNOSIS — I10 ESSENTIAL HYPERTENSION WITH GOAL BLOOD PRESSURE LESS THAN 140/90: ICD-10-CM

## 2017-11-30 PROCEDURE — 99214 OFFICE O/P EST MOD 30 MIN: CPT | Performed by: FAMILY MEDICINE

## 2017-11-30 RX ORDER — AMLODIPINE BESYLATE 10 MG/1
10 TABLET ORAL DAILY
Qty: 90 TABLET | Refills: 1 | Status: SHIPPED | OUTPATIENT
Start: 2017-11-30 | End: 2018-07-30

## 2017-11-30 NOTE — MR AVS SNAPSHOT
"              After Visit Summary   2017    Duane T Nienow    MRN: [id number]           Patient Information     Date Of Birth          1950        Visit Information        Provider Department      2017 1:30 PM Srinivasa Berumen MD [facility]        Today's Diagnoses     Essential hypertension with goal blood pressure less than 140/90          Care Instructions    Let me see you back in 6 months for a physical with fasting labs.          Follow-ups after your visit        Who to contact     If you have questions or need follow up information about today's clinic visit or your schedule please contact [facility] directly at [phone].  Normal or non-critical lab and imaging results will be communicated to you by MyChart, letter or phone within 4 business days after the clinic has received the results. If you do not hear from us within 7 days, please contact the clinic through MyChart or phone. If you have a critical or abnormal lab result, we will notify you by phone as soon as possible.  Submit refill requests through [employer] or call your pharmacy and they will forward the refill request to us. Please allow 3 business days for your refill to be completed.          Additional Information About Your Visit        MyChart Information     Tricida lets you send messages to your doctor, view your test results, renew your prescriptions, schedule appointments and more. To sign up, go to [email or web address] . Click on \"Log in\" on the left side of the screen, which will take you to the Welcome page. Then click on \"Sign up Now\" on the right side of the page.     You will be asked to enter the access code listed below, as well as some personal information. Please follow the directions to create your username and password.     Your access code is: [email or web address]  Expires: 2018  7:31 AM     Your access code will  in 90 days. If you need help or a new code, please call your " [facility] or [phone].        Care EveryWhere ID     This is your Care EveryWhere ID. This could be used by other organizations to access your [facility] medical records  [id number]        Your Vitals Were     Pulse Temperature Pulse Oximetry BMI (Body Mass Index)          71 98.9  F (37.2  C) (Oral) 98% 30.3 kg/m2         Blood Pressure from Last 3 Encounters:   11/30/17 130/80   11/28/17 136/84   11/07/17 102/67    Weight from Last 3 Encounters:   11/30/17 225 lb (102.1 kg)   05/01/17 229 lb (103.9 kg)   07/26/16 223 lb (101.2 kg)              Today, you had the following     No orders found for display         Where to get your medicines      These medications were sent to [facility] [id number] [address] [employer] [facility], [address]  [address] [address], [place] [address]     Phone:  [phone]     amLODIPine 10 MG tablet          Primary Care Provider Office Phone # Fax #    Srinivasa Berumen [phone] [address] [facility] [address]        Equal Access to Services     SHELLY CHRISTIAN AH: Hadii aad ku hadasho Soomaali, waaxda luqadaha, qaybta kaalmada adeegyada, benji perez. So [facility] [phone].    ATENCIÓN: Si habla español, tiene a coto disposición servicios gratuitos de asistencia lingüística. Llame al [phone].    We comply with applicable federal civil rights laws and [place] laws. We do not discriminate on the basis of race, color, national origin, age, disability, sex, sexual orientation, or gender identity.            Thank you!     Thank you for choosing [facility]  for your care. Our goal is always to provide you with excellent care. Hearing back from our patients is one way we can continue to improve our services. Please take a few minutes to complete the written survey that you may receive in the mail after your visit with us. Thank you!             Your Updated Medication List - Protect others  around you: Learn how to safely use, store and throw away your medicines at www.disposemymeds.org.          This list is accurate as of: 11/30/17  1:47 PM.  Always use your most recent med list.                   Brand Name Dispense Instructions for use Diagnosis    amLODIPine 10 MG tablet    NORVASC    90 tablet    Take 1 tablet (10 mg) by mouth daily    Essential hypertension with goal blood pressure less than 140/90       aspirin 81 MG tablet      Take  by mouth daily.        MULTI-VITAMIN DAILY PO      Take  by mouth.

## 2017-11-30 NOTE — PROGRESS NOTES
HPI:    Duane is a 66 year old male here to discuss:    HTN - Dx'd around: 2010. BP checks outside the clinic: about once per week - around 130/70  Evaluation and treatment:    Norvasc 10 mg qPM - trace ankle swelling but tolerable.   Lisinopril, HCTZ, Losartan caused cough and dry skin.   Counseling done today: Advised to avoid excessive salt. Regular exercise, following the DASH diet and trying to achieve healthy weight are all important.   Continue current tx.    Last Basic Metabolic Panel:  Lab Results   Component Value Date     05/01/2017      Lab Results   Component Value Date    POTASSIUM 4.2 05/01/2017     Lab Results   Component Value Date    CHLORIDE 108 05/01/2017     Lab Results   Component Value Date    JUSTIN 9.0 05/01/2017     Lab Results   Component Value Date    CO2 27 05/01/2017     Lab Results   Component Value Date    BUN 15 05/01/2017     Lab Results   Component Value Date    CR 1.09 05/01/2017     Lab Results   Component Value Date     05/01/2017       CBC RESULTS:   Recent Labs   Lab Test  01/20/16   0947   WBC  6.9   RBC  4.81   HGB  14.5   HCT  41.7   MCV  87   MCH  30.1   MCHC  34.8   RDW  12.8   PLT  209     Dyslipidemia - no h/o vascular disease like CAD, PAD, CVA and no h/o diabetes.   Evaluation and treatment:    Per ATP, moderate statin recommended.   I had rx'd Lipitor 20 mg qd - did not take it.   Counseling done today regarding healthy weight, diet and exercise.     Recent Labs   Lab Test  05/01/17   0953  01/20/16   0947  02/03/15   0755  07/01/13   0937   CHOL  175  153  152  154   HDL  48  39*  37*  37*   LDL  106*  82  91  97   TRIG  103  159*  120  101   CHOLHDLRATIO   --    --   4.1  4.1     The 10-year ASCVD risk score (Gaston JOSIANE Jr, et al., 2013) is: 16.5%    Values used to calculate the score:      Age: 66 years      Sex: Male      Is Non- : No      Diabetic: No      Tobacco smoker: No      Systolic Blood Pressure: 130 mmHg      Is BP  treated: Yes      HDL Cholesterol: 48 mg/dL      Total Cholesterol: 175 mg/dL    Obesity -   Evaluation and treatment:    declined referral to nutrition.    Diet and exercise discussed.    Body mass index is 30.3 kg/(m^2).      TSH   Date Value Ref Range Status   02/03/2015 1.56 0.40 - 4.00 mU/L Final     Comment:     Effective 7/30/2014, the reference range for this assay has changed to reflect   new instrumentation/methodology.         Wt Readings from Last 5 Encounters:   11/30/17 225 lb (102.1 kg)   05/01/17 229 lb (103.9 kg)   07/26/16 223 lb (101.2 kg)   02/17/16 220 lb (99.8 kg)   01/20/16 217 lb (98.4 kg)     Chronic nasal obstruction - he tells me he saw an ENT doctor many years ago and was told all was well.    Elevated PSA -  Evaluation and treatment:    He was seen by urology and bx was negative.   He is asked to follow the advice of urology.    PSA   Date Value Ref Range Status   05/01/2017 5.05 (H) 0 - 4 ug/L Final     Comment:     Assay Method:  Chemiluminescence using Siemens Vista analyzer     Preventive - Declines flu shot, Tdap, Prevnar, Shingles and Pneumovax.      There is no immunization history on file for this patient.      Lipids screen:    Recent Labs   Lab Test  05/01/17   0953  01/20/16   0947  02/03/15   0755  07/01/13   0937   CHOL  175  153  152  154   HDL  48  39*  37*  37*   LDL  106*  82  91  97   TRIG  103  159*  120  101   CHOLHDLRATIO   --    --   4.1  4.1     Colonoscopy: 11/7/17 - repeat in 3 years.     Advanced direction: referred previously    Hep C screen: negative 7/5/13    ROS:    Const: No fevers or night sweats recently.  ENT: No sore throat or ear pain.  Resp: No cough or shortness of breath.  CV: No chest pain, dizziness or cardiac palpitations.  GI: No nausea, vomiting, diarrhea or constipation. Denies blood in stools or black stools.  : No dysuria, frequency or hematuria.    SH:    Marital status:   Kids: 4  Employment: retired  Exercise: walks about 2 miles  every other day. Not going to Gym any more. But golfing 4-5 per week.   Tobacco: no  Etoh: 3-4 per week  Recreational drugs: no  Caffeine: coffee 2 cups per day.    Exam:    /80  Pulse 71  Temp 98.9  F (37.2  C) (Oral)  Wt 225 lb (102.1 kg)  SpO2 98%  BMI 30.3 kg/m2    Gen: Healthy appearing male in no acute distress  Eyes: Conjunctiva and sclera normal. Pupils react normally to light. No nystagmus.  Neck: No enlarged lymph nodes, thyromegally or other masses.  Lungs: Good air movement and otherwise clear.  CV: Heart RRR with no murmurs. No JVD, carotid bruits or leg edema.    Assessment and Plan - Decision Making    1. Essential hypertension with goal blood pressure less than 140/90  Per HPI  - amLODIPine (NORVASC) 10 MG tablet; Take 1 tablet (10 mg) by mouth daily  Dispense: 90 tablet; Refill: 1    2. Dyslipidemia  Per HPI      Written instructions given as follows:    Patient Instructions   Let me see you back in 6 months for a physical with fasting labs.

## 2018-07-27 NOTE — PROGRESS NOTES
HPI:    Duane is a 67 year old male here to discuss:    HTN - Dx'd around: 2010. BP checks outside the clinic: about once per week - around 130/70  Evaluation and treatment:    Norvasc 10 mg qPM - trace ankle swelling has resolved.   Lisinopril, HCTZ, Losartan caused cough and dry skin.   Counseling done today: Advised to avoid excessive salt. Regular exercise, following the DASH diet and trying to achieve healthy weight are all important.   Continue current tx.    Last Comprehensive Metabolic Panel:  Sodium   Date Value Ref Range Status   07/30/2018 139 133 - 144 mmol/L Final     Potassium   Date Value Ref Range Status   07/30/2018 3.8 3.4 - 5.3 mmol/L Final     Chloride   Date Value Ref Range Status   07/30/2018 108 94 - 109 mmol/L Final     Carbon Dioxide   Date Value Ref Range Status   07/30/2018 20 20 - 32 mmol/L Final     Anion Gap   Date Value Ref Range Status   07/30/2018 11 3 - 14 mmol/L Final     Glucose   Date Value Ref Range Status   07/30/2018 91 70 - 99 mg/dL Final     Comment:     Fasting specimen     Urea Nitrogen   Date Value Ref Range Status   07/30/2018 12 7 - 30 mg/dL Final     Creatinine   Date Value Ref Range Status   07/30/2018 1.03 0.66 - 1.25 mg/dL Final     GFR Estimate   Date Value Ref Range Status   07/30/2018 72 >60 mL/min/1.7m2 Final     Comment:     Non  GFR Calc     Calcium   Date Value Ref Range Status   07/30/2018 9.1 8.5 - 10.1 mg/dL Final       Dyslipidemia - no h/o vascular disease like CAD, PAD, CVA and no h/o diabetes.   Evaluation and treatment:    Per ATP, moderate statin recommended.   I had rx'd Lipitor 20 mg qd - did not take it.   Counseling done today regarding healthy weight, diet and exercise.     Recent Labs   Lab Test  07/30/18   1319  05/01/17   0953   02/03/15   0755  07/01/13   0937   CHOL  167  175   < >  152  154   HDL  40  48   < >  37*  37*   LDL  93  106*   < >  91  97   TRIG  169*  103   < >  120  101   CHOLHDLRATIO   --    --    --   4.1   4.1    < > = values in this interval not displayed.       The 10-year ASCVD risk score ([place] JOSIANE Jr, et al., 2013) is: 19.8%    Values used to calculate the score:      Age: 67 years      Sex: Male      Is Non- : No      Diabetic: No      Tobacco smoker: No      Systolic Blood Pressure: 134 mmHg      Is BP treated: Yes      HDL Cholesterol: 40 mg/dL      Total Cholesterol: 167 mg/dL    Obesity -   Evaluation and treatment:    declined referral to nutrition.    Diet and exercise discussed.    Body mass index is 29.77 kg/(m^2).      TSH   Date Value Ref Range Status   02/03/2015 1.56 0.40 - 4.00 mU/L Final     Comment:     Effective 7/30/2014, the reference range for this assay has changed to reflect   new instrumentation/methodology.         Wt Readings from Last 5 Encounters:   07/30/18 221 lb (100.2 kg)   11/30/17 225 lb (102.1 kg)   05/01/17 229 lb (103.9 kg)   07/26/16 223 lb (101.2 kg)   02/17/16 220 lb (99.8 kg)     Chronic nasal obstruction - he tells me he saw an ENT doctor many years ago and was told all was well.    SK - scattered throughout his skin.  Evaluation and treatment:    I explained these are benign and I could freeze some of them if he wants.   His wife had suggested seeing derm - I placed the referral.    Elevated PSA -  Evaluation and treatment:    He was seen by urology and bx was negative.   He is asked to follow the advice of urology.    PSA   Date Value Ref Range Status   05/01/2017 5.05 (H) 0 - 4 ug/L Final     Comment:     Assay Method:  Chemiluminescence using Siemens Vista analyzer     Preventive - Declines flu shot, Tdap, Prevnar, Shingles and Pneumovax.      There is no immunization history on file for this patient.    Colonoscopy: 11/7/17 - repeat in 3 years.     Advanced direction: referred previously    Hep C screen: negative 7/5/13    ROS:    Const: No fevers or night sweats recently.  ENT: No sore throat or ear pain.  Resp: No cough or shortness of  "breath.  CV: No chest pain, dizziness or cardiac palpitations.  GI: No nausea, vomiting, diarrhea or constipation. Denies blood in stools or black stools.  : No dysuria, frequency or hematuria.    SH:    Marital status:   Kids: 4  Employment: retired  Exercise: walks about 2 miles every other day. Not going to Gym any more. But golfing 4-5 per week.   Tobacco: no  Etoh: 3-4 per week  Recreational drugs: no  Caffeine: coffee 2 cups per day.    Exam:    /87 (Cuff Size: Adult Large)  Pulse 77  Temp 97.6  F (36.4  C) (Oral)  Resp 14  Ht 6' 0.25\" (1.835 m)  Wt 221 lb (100.2 kg)  SpO2 97%  BMI 29.77 kg/m2    Gen: Healthy appearing male in no acute distress  Eyes: Conjunctiva and sclera normal. Pupils react normally to light. No nystagmus.  Neck: No enlarged lymph nodes, thyromegally or other masses.  Lungs: Good air movement and otherwise clear.  CV: Heart RRR with no murmurs. No JVD, carotid bruits or leg edema.  Skin: typical SK lesions scattered on his skin.    Assessment and Plan - Decision Making    1. Essential hypertension with goal blood pressure less than 140/90  Per HPI  - amLODIPine (NORVASC) 10 MG tablet; Take 1 tablet (10 mg) by mouth daily  Dispense: 90 tablet; Refill: 3  - Basic metabolic panel    2. Dyslipidemia  Per HPI  - Lipid panel reflex to direct LDL Fasting    3. SK (seborrheic keratosis)  Per HPI  - DERMATOLOGY REFERRAL      Written instructions given as follows:    Patient Instructions   1. Keep up the good work.    2. If you want a skin check, set up an appointment with [facility] [phone].    3. I will contact you about your test results.    4. If all is well, see you in one year for a physical with fasting labs.      "

## 2018-07-30 ENCOUNTER — OFFICE VISIT (OUTPATIENT)
Dept: FAMILY MEDICINE | Facility: CLINIC | Age: 68
End: 2018-07-30
Payer: COMMERCIAL

## 2018-07-30 VITALS
SYSTOLIC BLOOD PRESSURE: 134 MMHG | DIASTOLIC BLOOD PRESSURE: 87 MMHG | OXYGEN SATURATION: 97 % | TEMPERATURE: 97.6 F | HEIGHT: 72 IN | WEIGHT: 221 LBS | HEART RATE: 77 BPM | RESPIRATION RATE: 14 BRPM | BODY MASS INDEX: 29.93 KG/M2

## 2018-07-30 DIAGNOSIS — L82.1 SK (SEBORRHEIC KERATOSIS): ICD-10-CM

## 2018-07-30 DIAGNOSIS — E78.5 DYSLIPIDEMIA: ICD-10-CM

## 2018-07-30 DIAGNOSIS — I10 ESSENTIAL HYPERTENSION WITH GOAL BLOOD PRESSURE LESS THAN 140/90: Primary | ICD-10-CM

## 2018-07-30 LAB
ANION GAP SERPL CALCULATED.3IONS-SCNC: 11 MMOL/L (ref 3–14)
BUN SERPL-MCNC: 12 MG/DL (ref 7–30)
CALCIUM SERPL-MCNC: 9.1 MG/DL (ref 8.5–10.1)
CHLORIDE SERPL-SCNC: 108 MMOL/L (ref 94–109)
CHOLEST SERPL-MCNC: 167 MG/DL
CO2 SERPL-SCNC: 20 MMOL/L (ref 20–32)
CREAT SERPL-MCNC: 1.03 MG/DL (ref 0.66–1.25)
GFR SERPL CREATININE-BSD FRML MDRD: 72 ML/MIN/1.7M2
GLUCOSE SERPL-MCNC: 91 MG/DL (ref 70–99)
HDLC SERPL-MCNC: 40 MG/DL
LDLC SERPL CALC-MCNC: 93 MG/DL
NONHDLC SERPL-MCNC: 127 MG/DL
POTASSIUM SERPL-SCNC: 3.8 MMOL/L (ref 3.4–5.3)
SODIUM SERPL-SCNC: 139 MMOL/L (ref 133–144)
TRIGL SERPL-MCNC: 169 MG/DL

## 2018-07-30 PROCEDURE — 36415 COLL VENOUS BLD VENIPUNCTURE: CPT | Performed by: FAMILY MEDICINE

## 2018-07-30 PROCEDURE — 80061 LIPID PANEL: CPT | Performed by: FAMILY MEDICINE

## 2018-07-30 PROCEDURE — 80048 BASIC METABOLIC PNL TOTAL CA: CPT | Performed by: FAMILY MEDICINE

## 2018-07-30 PROCEDURE — 99214 OFFICE O/P EST MOD 30 MIN: CPT | Performed by: FAMILY MEDICINE

## 2018-07-30 RX ORDER — AMLODIPINE BESYLATE 10 MG/1
10 TABLET ORAL DAILY
Qty: 90 TABLET | Refills: 3 | Status: SHIPPED | OUTPATIENT
Start: 2018-07-30 | End: 2019-12-05

## 2018-07-30 NOTE — NURSING NOTE
"Chief Complaint   Patient presents with     Hypertension     Health Maintenance     Tdap and prevnar       Initial /87 (Cuff Size: Adult Large)  Pulse 77  Temp 97.6  F (36.4  C) (Oral)  Resp 14  Ht 6' 0.25\" (1.835 m)  Wt 221 lb (100.2 kg)  SpO2 97%  BMI 29.77 kg/m2 Estimated body mass index is 29.77 kg/(m^2) as calculated from the following:    Height as of this encounter: 6' 0.25\" (1.835 m).    Weight as of this encounter: 221 lb (100.2 kg).      Lauren Rod LPN    "

## 2018-07-30 NOTE — LETTER
August 6, 2018    Duane T Nienow  [address]  [place] [address]        Dear Duane,    The results of your recent tests were normal.  Below is a copy of the results.  It was a pleasure to see you at your last appointment.    If you have any questions or concerns, please call myself or my nurse at [phone].    Sincerely,     Srinivasa Berumen MD/iván    Results for orders placed or performed in visit on 07/30/18   Basic metabolic panel   Result Value Ref Range    Sodium 139 133 - 144 mmol/L    Potassium 3.8 3.4 - 5.3 mmol/L    Chloride 108 94 - 109 mmol/L    Carbon Dioxide 20 20 - 32 mmol/L    Anion Gap 11 3 - 14 mmol/L    Glucose 91 70 - 99 mg/dL    Urea Nitrogen 12 7 - 30 mg/dL    Creatinine 1.03 0.66 - 1.25 mg/dL    GFR Estimate 72 >60 mL/min/1.7m2    GFR Estimate If Black 87 >60 mL/min/1.7m2    Calcium 9.1 8.5 - 10.1 mg/dL   Lipid panel reflex to direct LDL Fasting   Result Value Ref Range    Cholesterol 167 <200 mg/dL    Triglycerides 169 (H) <150 mg/dL    HDL Cholesterol 40 >39 mg/dL    LDL Cholesterol Calculated 93 <100 mg/dL    Non HDL Cholesterol 127 <130 mg/dL

## 2018-07-30 NOTE — PATIENT INSTRUCTIONS
1. Keep up the good work.    2. If you want a skin check, set up an appointment with [facility] [phone].    3. I will contact you about your test results.    4. If all is well, see you in one year for a physical with fasting labs.

## 2018-07-30 NOTE — MR AVS SNAPSHOT
After Visit Summary   7/30/2018    Duane T Nienow    MRN: [id number]           Patient Information     Date Of Birth          1950        Visit Information        Provider Department      7/30/2018 12:30 PM Srinivasa Berumen MD [facility]        Today's Diagnoses     Essential hypertension with goal blood pressure less than 140/90    -  1    Dyslipidemia        SK (seborrheic keratosis)          Care Instructions    1. Keep up the good work.    2. If you want a skin check, set up an appointment with [facility] [phone].    3. I will contact you about your test results.    4. If all is well, see you in one year for a physical with fasting labs.          Follow-ups after your visit        Additional Services     DERMATOLOGY REFERRAL       Your provider has referred you to: [facility] [phone]   [email or web address]    Please be aware that coverage of these services is subject to the terms and limitations of your health insurance plan.  Call member services at your health plan with any benefit or coverage questions.      Please bring the following to your appointment:  Any x-rays, CTs or MRIs which have been performed.  Contact the facility where they were done to arrange for  prior to your scheduled appointment.  Any new CT, MRI or other procedures ordered by your specialist must be performed at a [facility] facility or coordinated by your clinic's referral office.    List of current medications   This referral request   Any documents/labs given to you for this referral                  Who to contact     If you have questions or need follow up information about today's clinic visit or your schedule please contact [facility] directly at [phone].  Normal or non-critical lab and imaging results will be communicated to you by MyChart, letter or phone within 4 business  "days after the clinic has received the results. If you do not hear from us within 7 days, please contact the clinic through [employer] or phone. If you have a critical or abnormal lab result, we will notify you by phone as soon as possible.  Submit refill requests through [employer] or call your pharmacy and they will forward the refill request to us. Please allow 3 business days for your refill to be completed.          Additional Information About Your Visit        Care EveryWhere ID     This is your Care EveryWhere ID. This could be used by other organizations to access your [facility] medical records  [id number]        Your Vitals Were     Pulse Temperature Respirations Height Pulse Oximetry BMI (Body Mass Index)    77 97.6  F (36.4  C) (Oral) 14 6' 0.25\" (1.835 m) 97% 29.77 kg/m2       Blood Pressure from Last 3 Encounters:   07/30/18 134/87   11/30/17 130/80   11/28/17 136/84    Weight from Last 3 Encounters:   07/30/18 221 lb (100.2 kg)   11/30/17 225 lb (102.1 kg)   05/01/17 229 lb (103.9 kg)              We Performed the Following     Basic metabolic panel     DERMATOLOGY REFERRAL     Lipid panel reflex to direct LDL Fasting          Where to get your medicines      These medications were sent to [facility] [id number] [place] [address], [address]  [address] [id number]     Phone:  [phone]     amLODIPine 10 MG tablet          Primary Care Provider Office Phone # Fax #    Srinivasa Berumen [phone] [address] [facility] [address]        Equal Access to Services     [facility] GINO : Hadii aad ku hadasho Soomaali, waaxda luqadaha, qaybta kaalmada aderico, benji perez. So [facility] [phone].    ATENCIÓN: Si habla español, tiene a coto disposición servicios gratuitos de asistencia lingüística. Llame al [phone].    We comply with applicable federal civil rights laws and [place] laws. We do not discriminate " on the basis of race, color, national origin, age, disability, sex, sexual orientation, or gender identity.            Thank you!     Thank you for choosing [facility]  for your care. Our goal is always to provide you with excellent care. Hearing back from our patients is one way we can continue to improve our services. Please take a few minutes to complete the written survey that you may receive in the mail after your visit with us. Thank you!             Your Updated Medication List - Protect others around you: Learn how to safely use, store and throw away your medicines at www.disposemymeds.org.          This list is accurate as of 7/30/18  1:16 PM.  Always use your most recent med list.                   Brand Name Dispense Instructions for use Diagnosis    amLODIPine 10 MG tablet    NORVASC    90 tablet    Take 1 tablet (10 mg) by mouth daily    Essential hypertension with goal blood pressure less than 140/90       aspirin 81 MG tablet      Take  by mouth daily.        MULTI-VITAMIN DAILY PO      Take  by mouth.

## 2019-11-19 ENCOUNTER — TELEPHONE (OUTPATIENT)
Dept: FAMILY MEDICINE | Facility: CLINIC | Age: 69
End: 2019-11-19

## 2019-11-19 NOTE — TELEPHONE ENCOUNTER
Message: Potential medication nonadherence. Your pt may potentially be nonadherent with Amlodipine 10mg. A 90 day supply was last filled 7/22/2019. Noncompliance may lead to treatment failure, further progression of the condition, and other complications.    Clinical Consideration  Discuss this issue of nonadherence with your patient at the next office visit.

## 2019-11-20 NOTE — TELEPHONE ENCOUNTER
Left message on answering machine for patient to call back to [phone].  Mckenzie Patel BSN, RN

## 2019-11-20 NOTE — TELEPHONE ENCOUNTER
Pt states he is still taking the amlodipine and has 3-4 weeks left.  Advised he is due for an appointment with Dr. Srinivasa Berumen and a fasting lab appointment.  Pt will call back and schedule an appointment.  Mckenzie LEEN, RN

## 2019-12-05 ENCOUNTER — TELEPHONE (OUTPATIENT)
Dept: FAMILY MEDICINE | Facility: CLINIC | Age: 69
End: 2019-12-05

## 2019-12-05 DIAGNOSIS — I10 ESSENTIAL HYPERTENSION WITH GOAL BLOOD PRESSURE LESS THAN 140/90: ICD-10-CM

## 2019-12-05 RX ORDER — AMLODIPINE BESYLATE 10 MG/1
10 TABLET ORAL DAILY
Qty: 70 TABLET | Refills: 0 | Status: SHIPPED | OUTPATIENT
Start: 2019-12-05 | End: 2022-06-23

## 2019-12-05 RX ORDER — AMLODIPINE BESYLATE 10 MG/1
10 TABLET ORAL DAILY
Qty: 70 TABLET | Refills: 3 | Status: SHIPPED | OUTPATIENT
Start: 2019-12-05 | End: 2019-12-05

## 2019-12-05 NOTE — TELEPHONE ENCOUNTER
.Reason for Call:  Medication or medication refill:    Do you use a [facility]?  Name of the pharmacy and phone number for the current request:  [facility] [phone]    Name of the medication requested: BP medication per pt, couldn't remember name but stated provider would know.    Other request: Patient calling to get refill until can be seen 02/2020. Thank you.    Can we leave a detailed message on this number? YES    Phone number patient can be reached at: Home number on file [phone] (home)    Best Time:     Call taken on 12/5/2019 at 12:39 PM by Kelsie Sweeney

## 2020-02-06 ENCOUNTER — OFFICE VISIT (OUTPATIENT)
Dept: FAMILY MEDICINE | Facility: CLINIC | Age: 70
End: 2020-02-06
Payer: COMMERCIAL

## 2020-02-06 VITALS
WEIGHT: 202 LBS | TEMPERATURE: 98 F | SYSTOLIC BLOOD PRESSURE: 138 MMHG | HEART RATE: 74 BPM | BODY MASS INDEX: 27.36 KG/M2 | OXYGEN SATURATION: 98 % | DIASTOLIC BLOOD PRESSURE: 77 MMHG | HEIGHT: 72 IN

## 2020-02-06 DIAGNOSIS — E78.5 DYSLIPIDEMIA: ICD-10-CM

## 2020-02-06 DIAGNOSIS — I10 ESSENTIAL HYPERTENSION WITH GOAL BLOOD PRESSURE LESS THAN 140/90: Primary | ICD-10-CM

## 2020-02-06 LAB
ANION GAP SERPL CALCULATED.3IONS-SCNC: 5 MMOL/L (ref 3–14)
BUN SERPL-MCNC: 18 MG/DL (ref 7–30)
CALCIUM SERPL-MCNC: 9.2 MG/DL (ref 8.5–10.1)
CHLORIDE SERPL-SCNC: 111 MMOL/L (ref 94–109)
CHOLEST SERPL-MCNC: 201 MG/DL
CO2 SERPL-SCNC: 23 MMOL/L (ref 20–32)
CREAT SERPL-MCNC: 1.09 MG/DL (ref 0.66–1.25)
GFR SERPL CREATININE-BSD FRML MDRD: 68 ML/MIN/{1.73_M2}
GLUCOSE SERPL-MCNC: 93 MG/DL (ref 70–99)
HDLC SERPL-MCNC: 50 MG/DL
LDLC SERPL CALC-MCNC: 137 MG/DL
NONHDLC SERPL-MCNC: 151 MG/DL
POTASSIUM SERPL-SCNC: 4.1 MMOL/L (ref 3.4–5.3)
SODIUM SERPL-SCNC: 139 MMOL/L (ref 133–144)
TRIGL SERPL-MCNC: 72 MG/DL

## 2020-02-06 PROCEDURE — 36415 COLL VENOUS BLD VENIPUNCTURE: CPT | Performed by: FAMILY MEDICINE

## 2020-02-06 PROCEDURE — 80048 BASIC METABOLIC PNL TOTAL CA: CPT | Performed by: FAMILY MEDICINE

## 2020-02-06 PROCEDURE — 99214 OFFICE O/P EST MOD 30 MIN: CPT | Performed by: FAMILY MEDICINE

## 2020-02-06 PROCEDURE — 80061 LIPID PANEL: CPT | Performed by: FAMILY MEDICINE

## 2020-02-06 RX ORDER — AMLODIPINE BESYLATE 5 MG/1
5 TABLET ORAL 2 TIMES DAILY
Qty: 180 TABLET | Refills: 3 | Status: SHIPPED | OUTPATIENT
Start: 2020-02-06 | End: 2022-11-03

## 2020-02-06 ASSESSMENT — MIFFLIN-ST. JEOR: SCORE: 1718.24

## 2020-02-06 NOTE — PROGRESS NOTES
HPI:    Duane is a 69 year old male here to discuss:    HTN - Dx'd around: 2010. BP checks outside the clinic: about once per week - around 130/70  Evaluation and treatment:    Lisinopril, HCTZ, Losartan caused cough and dry skin.   Norvasc 10 mg qPM - trace ankle swelling has resolved.   He would like to take 5 bid due to urinating at night - I explained these are likely not related. He still wants to try which I approved.   Counseling done today: Advised to avoid excessive salt. Regular exercise, following the DASH diet and trying to achieve healthy weight are all important.   Continue current tx.    BP Readings from Last 6 Encounters:   02/06/20 138/77   07/30/18 134/87   11/30/17 130/80   11/28/17 136/84   11/07/17 102/67   10/31/17 132/88       Last Comprehensive Metabolic Panel:  Sodium   Date Value Ref Range Status   02/06/2020 139 133 - 144 mmol/L Final     Potassium   Date Value Ref Range Status   02/06/2020 4.1 3.4 - 5.3 mmol/L Final     Chloride   Date Value Ref Range Status   02/06/2020 111 (H) 94 - 109 mmol/L Final     Carbon Dioxide   Date Value Ref Range Status   02/06/2020 23 20 - 32 mmol/L Final     Anion Gap   Date Value Ref Range Status   02/06/2020 5 3 - 14 mmol/L Final     Glucose   Date Value Ref Range Status   02/06/2020 93 70 - 99 mg/dL Final     Comment:     Fasting specimen     Urea Nitrogen   Date Value Ref Range Status   02/06/2020 18 7 - 30 mg/dL Final     Creatinine   Date Value Ref Range Status   02/06/2020 1.09 0.66 - 1.25 mg/dL Final     GFR Estimate   Date Value Ref Range Status   02/06/2020 68 >60 mL/min/[1.73_m2] Final     Comment:     Non  GFR Calc  Starting 12/18/2018, serum creatinine based estimated GFR (eGFR) will be   calculated using the Chronic Kidney Disease Epidemiology Collaboration   (CKD-EPI) equation.       Calcium   Date Value Ref Range Status   02/06/2020 9.2 8.5 - 10.1 mg/dL Final     Dyslipidemia - no h/o vascular disease like CAD, PAD, CVA and no  h/o diabetes.   Evaluation and treatment:    Per ATP, moderate statin recommended.   I previously rx'd Lipitor 20 mg qd - did not take it - I will offer it to him again and see what he says.   Counseling done today regarding healthy weight, diet and exercise.     The 10-year ASCVD risk score (Valencia JOSHUA Jr., et al., 2013) is: 23.6%    Values used to calculate the score:      Age: 69 years      Sex: Male      Is Non- : No      Diabetic: No      Tobacco smoker: No      Systolic Blood Pressure: 138 mmHg      Is BP treated: Yes      HDL Cholesterol: 50 mg/dL      Total Cholesterol: 201 mg/dL      Recent Labs   Lab Test 02/06/20  0954 07/30/18  1319  02/03/15  0755 07/01/13  0937   CHOL 201* 167   < > 152 154   HDL 50 40   < > 37* 37*   * 93   < > 91 97   TRIG 72 169*   < > 120 101   CHOLHDLRATIO  --   --   --  4.1 4.1    < > = values in this interval not displayed.     Obesity - he has lost weight via diet and exercise - I commended him.  Evaluation and treatment:    declined referral to nutrition.    Diet and exercise discussed.     Body mass index is 27.21 kg/m .      TSH   Date Value Ref Range Status   02/03/2015 1.56 0.40 - 4.00 mU/L Final     Comment:     Effective 7/30/2014, the reference range for this assay has changed to reflect   new instrumentation/methodology.         Wt Readings from Last 5 Encounters:   02/06/20 91.6 kg (202 lb)   07/30/18 100.2 kg (221 lb)   11/30/17 102.1 kg (225 lb)   05/01/17 103.9 kg (229 lb)   07/26/16 101.2 kg (223 lb)     Chronic nasal obstruction - he tells me he saw an ENT doctor many years ago and was told all was well.    SK - scattered throughout his skin.  Evaluation and treatment:    I explained these are benign and I could freeze some of them if he wants.   His wife had suggested seeing derm - I previously placed the referral.    Elevated PSA -  Evaluation and treatment:    He was seen by urology and bx was negative.   He is asked to follow the  "advice of urology.    Preventive - Declines flu shot, Tdap, Prevnar, Shingles and Pneumovax.      There is no immunization history on file for this patient.    Colonoscopy: 11/7/17 - repeat in 3 years.     Advanced direction: referred previously and today.    Hep C screen: negative 7/5/13    ROS:    Const: No fevers or night sweats recently.  ENT: No sore throat or ear pain.  Resp: No cough or shortness of breath.  CV: No chest pain, dizziness or cardiac palpitations.  GI: No nausea, vomiting, diarrhea or constipation. Denies blood in stools or black stools.  : No dysuria, frequency or hematuria.    SH:    Marital status:   Kids: 4  Employment: retired  Exercise: walks about 2 miles every other day. Not going to Gym any more. But golfing 4-5 per week.   Tobacco: no  Etoh: 3-4 per week  Recreational drugs: no  Caffeine: coffee 2 cups per day.    Exam:    /77 (Cuff Size: Adult Regular)   Pulse 74   Temp 98  F (36.7  C) (Oral)   Ht 1.835 m (6' 0.25\")   Wt 91.6 kg (202 lb)   SpO2 98%   BMI 27.21 kg/m      Gen: Healthy appearing male in no acute distress  Eyes: Conjunctiva and sclera normal. Pupils react normally to light. No nystagmus.  Neck: No enlarged lymph nodes, thyromegally or other masses.  Lungs: Good air movement and otherwise clear.  CV: Heart RRR with no murmurs. No JVD, carotid bruits or leg edema.  Skin: typical SK lesions scattered on his skin.    Assessment and Plan - Decision Making    1. Essential hypertension with goal blood pressure less than 140/90    Per HPI    - amLODIPine (NORVASC) 5 MG tablet; Take 1 tablet (5 mg) by mouth 2 times daily  Dispense: 180 tablet; Refill: 3  - Basic metabolic panel    2. Dyslipidemia    Per HPI    - Lipid panel reflex to direct LDL Fasting      Written instructions given as follows:    Patient Instructions   See you in one year for a physical with fasting labs.          "

## 2020-02-06 NOTE — LETTER
February 10, 2020    Duane T Nienow  [address]  [place] [address]            Dear Duane,    Your cholesterol is still elevated. Previously I had prescribed you Lipitor but you did not take it. Let me know if you change your mind so I can prescribe it again.     All other results were fine.     Below is a copy of the results.  It was a pleasure to see you at your last appointment.    If you have any questions or concerns, please call myself or my nurse at [phone].    Sincerely,    Srinivasa Berumen MD  / [facility]    Results for orders placed or performed in visit on 02/06/20   Lipid panel reflex to direct LDL Fasting     Status: Abnormal   Result Value Ref Range    Cholesterol 201 (H) <200 mg/dL    Triglycerides 72 <150 mg/dL    HDL Cholesterol 50 >39 mg/dL    LDL Cholesterol Calculated 137 (H) <100 mg/dL    Non HDL Cholesterol 151 (H) <130 mg/dL   Basic metabolic panel     Status: Abnormal   Result Value Ref Range    Sodium 139 133 - 144 mmol/L    Potassium 4.1 3.4 - 5.3 mmol/L    Chloride 111 (H) 94 - 109 mmol/L    Carbon Dioxide 23 20 - 32 mmol/L    Anion Gap 5 3 - 14 mmol/L    Glucose 93 70 - 99 mg/dL    Urea Nitrogen 18 7 - 30 mg/dL    Creatinine 1.09 0.66 - 1.25 mg/dL    GFR Estimate 68 >60 mL/min/[1.73_m2]    GFR Estimate If Black 79 >60 mL/min/[1.73_m2]    Calcium 9.2 8.5 - 10.1 mg/dL

## 2020-02-07 NOTE — RESULT ENCOUNTER NOTE
Please mail letter:    Denise Duane,    Your cholesterol is still elevated. Previously I had prescribed you Lipitor but you did not take it. Let me know if you change your mind so I can prescribe it again.    All other results were fine.    Regards,    Srinivasa Berumen M.D.

## 2022-06-23 ENCOUNTER — NURSE TRIAGE (OUTPATIENT)
Dept: FAMILY MEDICINE | Facility: CLINIC | Age: 72
End: 2022-06-23

## 2022-06-23 ENCOUNTER — OFFICE VISIT (OUTPATIENT)
Dept: URGENT CARE | Facility: URGENT CARE | Age: 72
End: 2022-06-23
Payer: COMMERCIAL

## 2022-06-23 VITALS
DIASTOLIC BLOOD PRESSURE: 94 MMHG | BODY MASS INDEX: 27.83 KG/M2 | HEART RATE: 76 BPM | OXYGEN SATURATION: 97 % | SYSTOLIC BLOOD PRESSURE: 156 MMHG | TEMPERATURE: 98.2 F | WEIGHT: 206.6 LBS

## 2022-06-23 DIAGNOSIS — T14.8XXA LOCAL INFECTION OF WOUND: Primary | ICD-10-CM

## 2022-06-23 DIAGNOSIS — M79.5 FOREIGN BODY (FB) IN SOFT TISSUE: ICD-10-CM

## 2022-06-23 DIAGNOSIS — L08.9 LOCAL INFECTION OF WOUND: Primary | ICD-10-CM

## 2022-06-23 LAB
BASOPHILS # BLD AUTO: 0.1 10E3/UL (ref 0–0.2)
BASOPHILS NFR BLD AUTO: 1 %
EOSINOPHIL # BLD AUTO: 0.1 10E3/UL (ref 0–0.7)
EOSINOPHIL NFR BLD AUTO: 2 %
ERYTHROCYTE [DISTWIDTH] IN BLOOD BY AUTOMATED COUNT: 12.5 % (ref 10–15)
HCT VFR BLD AUTO: 39.8 % (ref 40–53)
HGB BLD-MCNC: 13.7 G/DL (ref 13.3–17.7)
LYMPHOCYTES # BLD AUTO: 2.1 10E3/UL (ref 0.8–5.3)
LYMPHOCYTES NFR BLD AUTO: 24 %
MCH RBC QN AUTO: 30.2 PG (ref 26.5–33)
MCHC RBC AUTO-ENTMCNC: 34.4 G/DL (ref 31.5–36.5)
MCV RBC AUTO: 88 FL (ref 78–100)
MONOCYTES # BLD AUTO: 0.6 10E3/UL (ref 0–1.3)
MONOCYTES NFR BLD AUTO: 6 %
NEUTROPHILS # BLD AUTO: 5.9 10E3/UL (ref 1.6–8.3)
NEUTROPHILS NFR BLD AUTO: 67 %
PLATELET # BLD AUTO: 242 10E3/UL (ref 150–450)
RBC # BLD AUTO: 4.54 10E6/UL (ref 4.4–5.9)
WBC # BLD AUTO: 8.8 10E3/UL (ref 4–11)

## 2022-06-23 PROCEDURE — 85025 COMPLETE CBC W/AUTO DIFF WBC: CPT | Performed by: EMERGENCY MEDICINE

## 2022-06-23 PROCEDURE — 36415 COLL VENOUS BLD VENIPUNCTURE: CPT | Performed by: EMERGENCY MEDICINE

## 2022-06-23 PROCEDURE — 99214 OFFICE O/P EST MOD 30 MIN: CPT | Performed by: EMERGENCY MEDICINE

## 2022-06-23 RX ORDER — SULFAMETHOXAZOLE/TRIMETHOPRIM 800-160 MG
1 TABLET ORAL 2 TIMES DAILY
Qty: 10 TABLET | Refills: 0 | Status: SHIPPED | OUTPATIENT
Start: 2022-06-23 | End: 2022-06-28

## 2022-06-23 NOTE — TELEPHONE ENCOUNTER
"Pt's spouse is calling for advice regarding new signs of infection in pt's toe wound initially treated at [facility].  No consent to communicate found on file. RN obtained verbal consent from pt, who is present with spouse, to speak with spouse today regarding this issue today.  After triaging the new sx, RN advised no appointments remain in clinic today and RN advised urgent care evaluation today. Pt's spouse states she and pt will come to [facility] now.    Reason for Disposition    Pus or cloudy fluid draining from wound    Taking antibiotic > 72 hours (3 days) and infected wound not improved (pain, pus, redness)    Additional Information    Negative: Stitches and not infected    Negative: Surgical wound infection suspected (post-op)    Negative: Bright red, wide-spread, sunburn-like rash    Negative: Black (necrotic) or blisters develop in wound    Negative: Looks infected (spreading redness, red streak, pus) and fever    Negative: Patient sounds very sick or weak to the triager    Negative: Severe pain in the wound    Negative: Red streak runs from the wound    Negative: Facial wound looks infected (spreading redness)    Negative: Finger wound and entire finger swollen    Answer Assessment - Initial Assessment Questions  1. LOCATION: \"Where is the wound located?\"       Toe    2. WOUND APPEARANCE: \"What does the wound look like?\"       Redness of the affected toe (4th toe)    3. SIZE: If redness is present, ask: \"What is the size of the red area?\" (Inches, centimeters, or compare to size of a coin)       Yes, entire toe    4. SPREAD: \"What's changed in the last day?\"  \"Do you see any red streaks coming from the wound?\"      Green/yellow drainage from toe today    5. ONSET: \"When did it start to look infected?\"       Green and yellow drainage today    6. MECHANISM: \"How did the wound start, what was the cause?\"      See [facility] encounter.    7. FEVER: \"Do you have a " "fever?\" If so, ask: \"What is your temperature, how was it measured, and when did it start?\"      No    8. OTHER SYMPTOMS: \"Do you have any other symptoms?\" (e.g., shaking chills, weakness, rash elsewhere on body)      Denies N/V, fever, chills, or sx of systemic illness    Protocols used: WOUND INFECTION-A-OH    JESUS McdonaldN, RN  "
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
## 2022-06-24 ENCOUNTER — TELEPHONE (OUTPATIENT)
Dept: FAMILY MEDICINE | Facility: CLINIC | Age: 72
End: 2022-06-24

## 2022-11-03 ENCOUNTER — OFFICE VISIT (OUTPATIENT)
Dept: FAMILY MEDICINE | Facility: CLINIC | Age: 72
End: 2022-11-03
Payer: COMMERCIAL

## 2022-11-03 VITALS
WEIGHT: 204 LBS | HEART RATE: 69 BPM | RESPIRATION RATE: 16 BRPM | BODY MASS INDEX: 27.63 KG/M2 | SYSTOLIC BLOOD PRESSURE: 136 MMHG | OXYGEN SATURATION: 97 % | TEMPERATURE: 97.7 F | DIASTOLIC BLOOD PRESSURE: 87 MMHG | HEIGHT: 72 IN

## 2022-11-03 DIAGNOSIS — L82.1 SK (SEBORRHEIC KERATOSIS): ICD-10-CM

## 2022-11-03 DIAGNOSIS — Z00.00 ENCOUNTER FOR MEDICARE ANNUAL WELLNESS EXAM: Primary | ICD-10-CM

## 2022-11-03 DIAGNOSIS — Z12.11 SCREEN FOR COLON CANCER: ICD-10-CM

## 2022-11-03 DIAGNOSIS — I10 ESSENTIAL HYPERTENSION WITH GOAL BLOOD PRESSURE LESS THAN 140/90: ICD-10-CM

## 2022-11-03 DIAGNOSIS — Z12.5 SCREENING FOR PROSTATE CANCER: ICD-10-CM

## 2022-11-03 LAB
ANION GAP SERPL CALCULATED.3IONS-SCNC: 5 MMOL/L (ref 3–14)
BUN SERPL-MCNC: 16 MG/DL (ref 7–30)
CALCIUM SERPL-MCNC: 8.9 MG/DL (ref 8.5–10.1)
CHLORIDE BLD-SCNC: 110 MMOL/L (ref 94–109)
CHOLEST SERPL-MCNC: 182 MG/DL
CO2 SERPL-SCNC: 27 MMOL/L (ref 20–32)
CREAT SERPL-MCNC: 1.02 MG/DL (ref 0.66–1.25)
FASTING STATUS PATIENT QL REPORTED: YES
GFR SERPL CREATININE-BSD FRML MDRD: 78 ML/MIN/1.73M2
GLUCOSE BLD-MCNC: 99 MG/DL (ref 70–99)
HDLC SERPL-MCNC: 54 MG/DL
LDLC SERPL CALC-MCNC: 106 MG/DL
NONHDLC SERPL-MCNC: 128 MG/DL
POTASSIUM BLD-SCNC: 3.9 MMOL/L (ref 3.4–5.3)
PSA SERPL-MCNC: 5.98 UG/L (ref 0–4)
SODIUM SERPL-SCNC: 142 MMOL/L (ref 133–144)
TRIGL SERPL-MCNC: 109 MG/DL

## 2022-11-03 PROCEDURE — 80048 BASIC METABOLIC PNL TOTAL CA: CPT | Performed by: FAMILY MEDICINE

## 2022-11-03 PROCEDURE — G0103 PSA SCREENING: HCPCS | Performed by: FAMILY MEDICINE

## 2022-11-03 PROCEDURE — 36415 COLL VENOUS BLD VENIPUNCTURE: CPT | Performed by: FAMILY MEDICINE

## 2022-11-03 PROCEDURE — 99397 PER PM REEVAL EST PAT 65+ YR: CPT | Performed by: FAMILY MEDICINE

## 2022-11-03 PROCEDURE — 80061 LIPID PANEL: CPT | Performed by: FAMILY MEDICINE

## 2022-11-03 RX ORDER — AMLODIPINE BESYLATE 5 MG/1
5 TABLET ORAL 2 TIMES DAILY
Qty: 180 TABLET | Refills: 3 | Status: SHIPPED | OUTPATIENT
Start: 2022-11-03

## 2022-11-03 ASSESSMENT — ENCOUNTER SYMPTOMS
DIZZINESS: 0
COUGH: 0
HEMATOCHEZIA: 0
MYALGIAS: 0
CONSTIPATION: 0
SORE THROAT: 0
ARTHRALGIAS: 1
HEMATURIA: 0
EYE PAIN: 0
DYSURIA: 0
DIARRHEA: 0
ABDOMINAL PAIN: 0
JOINT SWELLING: 0
SHORTNESS OF BREATH: 0
NAUSEA: 0
HEARTBURN: 0
PALPITATIONS: 0
CHILLS: 0
NERVOUS/ANXIOUS: 0
HEADACHES: 0
FEVER: 0
PARESTHESIAS: 0
FREQUENCY: 0
WEAKNESS: 0

## 2022-11-03 ASSESSMENT — ACTIVITIES OF DAILY LIVING (ADL): CURRENT_FUNCTION: NO ASSISTANCE NEEDED

## 2022-11-03 NOTE — LETTER
November 7, 2022      Duane T Nienow  [address]  [place] [address]        Hello Duane,     The PSA was slightly higher than before at 5.98 (previously 5.05, 4.26, 4.65). I know you have had a negative prostate biopsy previously. I think it would be reasonable to keep checking this yearly.     But if you would like to visit with the urologist to discuss what other options are available, let me know so I can refer you.     Your cholesterol was a bit high. Your estimated 10 year risk of a heart attack or stroke is 24.4%. Statin drugs are recommended at 7.5% or greater.     Previously I had prescribed Lipitor 20 mg but you were not interested in it. At our clinic visit you told me you would consider taking it if the cholesterol was higher. As you can see below the LDL (bad cholesterol) improved from 137 to 106 and HDL (the good cholesterol) improved from 50 to 54. My guess is you don't want the medication but if you change your mind, let me know.     All other results were fine.     Regards,     Srinivasa Berumen M.D.       Resulted Orders   Lipid panel reflex to direct LDL Non-fasting   Result Value Ref Range    Cholesterol 182 <200 mg/dL    Triglycerides 109 <150 mg/dL    Direct Measure HDL 54 >=40 mg/dL    LDL Cholesterol Calculated 106 (H) <=100 mg/dL    Non HDL Cholesterol 128 <130 mg/dL    Patient Fasting > 8hrs? Yes     Narrative    Cholesterol  Desirable:  <200 mg/dL    Triglycerides  Normal:  Less than 150 mg/dL  Borderline High:  150-199 mg/dL  High:  200-499 mg/dL  Very High:  Greater than or equal to 500 mg/dL    Direct Measure HDL  Female:  Greater than or equal to 50 mg/dL   Male:  Greater than or equal to 40 mg/dL    LDL Cholesterol  Desirable:  <100mg/dL  Above Desirable:  100-129 mg/dL   Borderline High:  130-159 mg/dL   High:  160-189 mg/dL   Very High:  >= 190 mg/dL    Non HDL Cholesterol  Desirable:  130 mg/dL  Above Desirable:  130-159 mg/dL  Borderline High:  160-189 mg/dL  High:  190-219  mg/dL  Very High:  Greater than or equal to 220 mg/dL   Basic metabolic panel  (Ca, Cl, CO2, Creat, Gluc, K, Na, BUN)   Result Value Ref Range    Sodium 142 133 - 144 mmol/L    Potassium 3.9 3.4 - 5.3 mmol/L    Chloride 110 (H) 94 - 109 mmol/L    Carbon Dioxide (CO2) 27 20 - 32 mmol/L    Anion Gap 5 3 - 14 mmol/L    Urea Nitrogen 16 7 - 30 mg/dL    Creatinine 1.02 0.66 - 1.25 mg/dL    Calcium 8.9 8.5 - 10.1 mg/dL    Glucose 99 70 - 99 mg/dL    GFR Estimate 78 >60 mL/min/1.73m2      Comment:      Effective December 21, 2021 eGFRcr in adults is calculated using the 2021 CKD-EPI creatinine equation which includes age and gender (Annabelle pollock al., NEJM, DOI: 10.1056/XAGTsq9533222)   PSA, screen   Result Value Ref Range    Prostate Specific Antigen Screen 5.98 (H) 0.00 - 4.00 ug/L

## 2022-11-03 NOTE — PATIENT INSTRUCTIONS
1. Follow a healthy diet - reliable information is available at: myplate.gov.    2. Get regular exercise based on your abilities like walking, biking, swimming and strength training (150 minutes per week).      3. Avoid the sun or otherwise use sun screen to prevent skin cancer.    4. See the dentist and eye doctor regularly.     5. Advanced directive or living will is a good idea. Fill out the form by going to: [email or web address], then have it notarized and send me a copy.     6. Set up the dermatology and colonoscopy appointments - see below for phone number.    7. I will contact you with results. If all is well, see you in one year for a physical with fasting labs.

## 2022-11-03 NOTE — PROGRESS NOTES
"SUBJECTIVE:   Duane is a 71 year old who presents for Preventive Visit.    Patient has been advised of split billing requirements and indicates understanding: Yes     Are you in the first 12 months of your Medicare coverage?  No    Healthy Habits:     In general, how would you rate your overall health?  Excellent    Frequency of exercise:  4-5 days/week    Duration of exercise:  45-60 minutes    Do you usually eat at least 4 servings of fruit and vegetables a day, include whole grains    & fiber and avoid regularly eating high fat or \"junk\" foods?  No    Taking medications regularly:  Yes    Ability to successfully perform activities of daily living:  No assistance needed    Home Safety:  No safety concerns identified    Hearing Impairment:  No hearing concerns    In the past 6 months, have you been bothered by leaking of urine?  No    In general, how would you rate your overall mental or emotional health?  Excellent      PHQ-2 Total Score: 0    Additional concerns today:  No    Do you feel safe in your environment? Yes    Have you ever done Advance Care Planning? (For example, a Health Directive, POLST, or a discussion with a medical provider or your loved ones about your wishes): No, advance care planning information given to patient to review.  Patient declined advance care planning discussion at this time.         Fall risk  Fallen 2 or more times in the past year?: No  Any fall with injury in the past year?: No    Cognitive Screening   1) Repeat 3 items (Leader, Season, Table)    2) Clock draw: NORMAL  3) 3 item recall: Recalls 3 objects  Results: 3 items recalled: COGNITIVE IMPAIRMENT LESS LIKELY    Mini-CogTM Copyright ELOY Scott. Licensed by the author for use in [facility]; reprinted with permission (tao@.[email or web address]). All rights reserved.      Do you have sleep apnea, excessive snoring or daytime drowsiness?: no    Reviewed and updated as needed this visit by clinical staff   Tobacco  Allergies  " Meds   Med Hx  Surg Hx  Fam Hx  Soc Hx        Reviewed and updated as needed this visit by Provider                 Social History     Tobacco Use     Smoking status: Former     Types: Cigarettes     Quit date: 1972     Years since quittin.4     Smokeless tobacco: Never   Substance Use Topics     Alcohol use: Yes     Comment: 3 drinks a week     If you drink alcohol do you typically have >3 drinks per day or >7 drinks per week? No    Alcohol Use 11/3/2022   Prescreen: >3 drinks/day or >7 drinks/week? No   Prescreen: >3 drinks/day or >7 drinks/week? -   No flowsheet data found.    HTN - Dx'd around: . BP checks at home sometimes - around 130/80  Evaluation and treatment:    Lisinopril, HCTZ, Losartan caused cough and dry skin.   Norvasc 5 mg bid (he was under the impression 10 mg daily was causing nocturia) - no side effects.    Counseling done today: Advised to avoid excessive salt. Regular exercise, following the DASH diet and trying to achieve healthy weight are all important.   Continue current tx.    BP Readings from Last 6 Encounters:   22 136/87   22 (!) 156/94   20 138/77   18 134/87   17 130/80   17 136/84       Last Comprehensive Metabolic Panel:  Sodium   Date Value Ref Range Status   2020 139 133 - 144 mmol/L Final     Potassium   Date Value Ref Range Status   2020 4.1 3.4 - 5.3 mmol/L Final     Chloride   Date Value Ref Range Status   2020 111 (H) 94 - 109 mmol/L Final     Carbon Dioxide   Date Value Ref Range Status   2020 23 20 - 32 mmol/L Final     Anion Gap   Date Value Ref Range Status   2020 5 3 - 14 mmol/L Final     Glucose   Date Value Ref Range Status   2020 93 70 - 99 mg/dL Final     Comment:     Fasting specimen     Urea Nitrogen   Date Value Ref Range Status   2020 18 7 - 30 mg/dL Final     Creatinine   Date Value Ref Range Status   2020 1.09 0.66 - 1.25 mg/dL Final     GFR Estimate   Date  Value Ref Range Status   02/06/2020 68 >60 mL/min/[1.73_m2] Final     Comment:     Non  GFR Calc  Starting 12/18/2018, serum creatinine based estimated GFR (eGFR) will be   calculated using the Chronic Kidney Disease Epidemiology Collaboration   (CKD-EPI) equation.       Calcium   Date Value Ref Range Status   02/06/2020 9.2 8.5 - 10.1 mg/dL Final     Dyslipidemia - no h/o vascular disease like CAD, PAD, CVA and no h/o diabetes.   Evaluation and treatment:    Per ATP, moderate statin recommended.   I previously rx'd Lipitor 20 mg qd - did not take it - he declines at this time but open to it if cholesterol is higher.   Counseling done today regarding healthy weight, diet and exercise.     The 10-year ASCVD risk score (Seng HARRIS, et al., 2019) is: 26.1%    Values used to calculate the score:      Age: 71 years      Sex: Male      Is Non- : No      Diabetic: No      Tobacco smoker: No      Systolic Blood Pressure: 136 mmHg      Is BP treated: Yes      HDL Cholesterol: 50 mg/dL      Total Cholesterol: 201 mg/dL      Recent Labs   Lab Test 02/06/20  0954 07/30/18  1319  02/03/15  0755 07/01/13  0937   CHOL 201* 167   < > 152 154   HDL 50 40   < > 37* 37*   * 93   < > 91 97   TRIG 72 169*   < > 120 101   CHOLHDLRATIO  --   --   --  4.1 4.1    < > = values in this interval not displayed.     Obesity - he has lost weight via diet and exercise - I commended him.  Evaluation and treatment:    declined referral to nutrition.    Diet and exercise discussed.    Snores and takes naps during the day.   Declines sleep apnea evaluation.    Body mass index is 27.67 kg/m .      TSH   Date Value Ref Range Status   02/03/2015 1.56 0.40 - 4.00 mU/L Final     Comment:     Effective 7/30/2014, the reference range for this assay has changed to reflect   new instrumentation/methodology.         Wt Readings from Last 5 Encounters:   11/03/22 92.5 kg (204 lb)   06/23/22 93.7 kg (206 lb 9.6 oz)    02/06/20 91.6 kg (202 lb)   07/30/18 100.2 kg (221 lb)   11/30/17 102.1 kg (225 lb)     Chronic nasal obstruction - he tells me he saw an ENT doctor many years ago and was told all was well.    SK - scattered throughout his skin.  Evaluation and treatment:    I explained these are benign and I could freeze some of them if he wants.   His wife had suggested seeing derm - I previously placed the referral. He does not recall seeing them. I referred him again     Left shoulder pain -   Follows with ortho.    Previous covid - June 2022. No residual symptoms.    Preventive - Declines flu shot, covid shot, Tdap, Prevnar, Shingles and Pneumovax.    Immunization History   Administered Date(s) Administered     DT (PEDS <7y) 02/08/2004       Colonoscopy: 11/7/17 - repeat in 3 years. Reordered today.    Hep C screen: negative 7/5/13    -HIV screen: declines    - Prostate CA screen: Discussed controversy about screening. He was seen by urology and bx was negative. Recheck again today.    PSA   Date Value Ref Range Status   05/01/2017 5.05 (H) 0 - 4 ug/L Final     Comment:     Assay Method:  Chemiluminescence using Siemens Vista analyzer     - AAA screen: he tells me he has smoked less than 100 cigarettes in his life and so declines U/S.    - Advanced Directive: referred today    SH:    Marital status:   Kids: 4  Employment: retired  Exercise: walks about 2 miles every other day. Not going to Gym any more. But golfing 2-3 per week.   Tobacco: no  Etoh: 3-4 per week  Recreational drugs: no  Caffeine: coffee 2 cups per day.      Current providers sharing in care for this patient include:  Patient Care Team:  Srinivasa Berumen MD as PCP - General (Family Practice)  Shelby Bond NP as Nurse Practitioner (Family Practice)  Srinivasa Berumen MD as Assigned PCP    The following health maintenance items are reviewed in Epic and correct as of today:  Health Maintenance   Topic Date Due     ANNUAL REVIEW OF HM ORDERS   Never done     COVID-19 Vaccine (1) Never done     DTAP/TDAP/TD IMMUNIZATION (1 - Tdap) Never done     ZOSTER IMMUNIZATION (1 of 2) Never done     MEDICARE ANNUAL WELLNESS VISIT  12/24/2014     Pneumococcal Vaccine: 65+ Years (1 - PCV) Never done     AORTIC ANEURYSM SCREENING (SYSTEM ASSIGNED)  Never done     COLORECTAL CANCER SCREENING  11/07/2020     LIPID  02/06/2021     INFLUENZA VACCINE (1) Never done     HEPATITIS B IMMUNIZATION (1 of 3 - 3-dose series) 12/01/2022 (Originally 1950)     FALL RISK ASSESSMENT  11/03/2023     ADVANCE CARE PLANNING  11/03/2027     HEPATITIS C SCREENING  Completed     PHQ-2 (once per calendar year)  Completed     IPV IMMUNIZATION  Aged Out     MENINGITIS IMMUNIZATION  Aged Out       Review of Systems   Constitutional: Negative for chills and fever.   HENT: Negative for congestion, ear pain, hearing loss and sore throat.    Eyes: Negative for pain and visual disturbance.   Respiratory: Negative for cough and shortness of breath.    Cardiovascular: Negative for chest pain, palpitations and peripheral edema.   Gastrointestinal: Negative for abdominal pain, constipation, diarrhea, heartburn, hematochezia and nausea.   Genitourinary: Negative for dysuria, frequency, genital sores, hematuria, impotence and urgency.   Musculoskeletal: Positive for arthralgias. Negative for joint swelling and myalgias.   Skin: Negative for rash.   Neurological: Negative for dizziness, weakness, headaches and paresthesias.   Psychiatric/Behavioral: Negative for mood changes. The patient is not nervous/anxious.          OBJECTIVE:   /87   Pulse 69   Temp 97.7  F (36.5  C) (Tympanic)   Resp 16   Ht 1.829 m (6')   Wt 92.5 kg (204 lb)   SpO2 97%   BMI 27.67 kg/m   Estimated body mass index is 27.67 kg/m  as calculated from the following:    Height as of this encounter: 1.829 m (6').    Weight as of this encounter: 92.5 kg (204 lb).  Physical Exam  GENERAL: healthy, alert and no distress  EYES:  Eyes grossly normal to inspection, PERRL and conjunctivae and sclerae normal  HENT: ear canals and TM's normal, nose and mouth without ulcers or lesions  NECK: no adenopathy, no asymmetry, masses, or scars and thyroid normal to palpation  RESP: lungs clear to auscultation - no rales, rhonchi or wheezes  CV: regular rate and rhythm, normal S1 S2, no S3 or S4, no murmur, click or rub, no peripheral edema and peripheral pulses strong  ABDOMEN: soft, nontender, no hepatosplenomegaly, no masses and bowel sounds normal  MS: no gross musculoskeletal defects noted, no edema  SKIN: scattered SK spots.  NEURO: Normal strength and tone, mentation intact and speech normal  PSYCH: mentation appears normal, affect normal/bright    ASSESSMENT / PLAN:         COUNSELING:  Reviewed preventive health counseling, as reflected in patient instructions       Regular exercise       Healthy diet/nutrition    Estimated body mass index is 27.67 kg/m  as calculated from the following:    Height as of this encounter: 1.829 m (6').    Weight as of this encounter: 92.5 kg (204 lb).    Weight management plan: Discussed healthy diet and exercise guidelines    He reports that he quit smoking about 50 years ago. His smoking use included cigarettes. He has never used smokeless tobacco.      Appropriate preventive services were discussed with this patient, including applicable screening as appropriate for cardiovascular disease, diabetes, osteopenia/osteoporosis, and glaucoma.  As appropriate for age/gender, discussed screening for colorectal cancer, prostate cancer, breast cancer, and cervical cancer. Checklist reviewing preventive services available has been given to the patient.    Reviewed patients plan of care and provided an AVS. The Basic Care Plan (routine screening as documented in Health Maintenance) for Duane meets the Care Plan requirement. This Care Plan has been established and reviewed with the Patient.      Assessment and Plan -  Decision Making    1. Encounter for Medicare annual wellness exam    Results of today's exam given to patient verbally along with age appropriate preventive measures. Written instructions reviewed and handed to the patient.    - Lipid panel reflex to direct LDL Non-fasting  - Basic metabolic panel  (Ca, Cl, CO2, Creat, Gluc, K, Na, BUN)    2. Essential hypertension with goal blood pressure less than 140/90    Per HPI    - amLODIPine (NORVASC) 5 MG tablet; Take 1 tablet (5 mg) by mouth 2 times daily  Dispense: 180 tablet; Refill: 3    3. Screen for colon cancer    Per HPI    - Colonoscopy Screening  Referral; Future    4. Screening for prostate cancer    Per HPI    - PSA, screen    5. SK (seborrheic keratosis)    Per HPI    - Adult Dermatology Referral; Future      Written instructions given as follows:    Patient Instructions   1. Follow a healthy diet - reliable information is available at: myplate.gov.    2. Get regular exercise based on your abilities like walking, biking, swimming and strength training (150 minutes per week).      3. Avoid the sun or otherwise use sun screen to prevent skin cancer.    4. See the dentist and eye doctor regularly.     5. Advanced directive or living will is a good idea. Fill out the form by going to: [email or web address], then have it notarized and send me a copy.     6. Set up the dermatology and colonoscopy appointments - see below for phone number.    7. I will contact you with results. If all is well, see you in one year for a physical with fasting labs.

## 2022-11-06 NOTE — RESULT ENCOUNTER NOTE
Please mail letter:    Hello Duane,    The PSA was slightly higher than before at 5.98 (previously 5.05, 4.26, 4.65). I know you have had a negative prostate biopsy previously. I think it would be reasonable to keep checking this yearly.    But if you would like to visit with the urologist to discuss what other options are available, let me know so I can refer you.    Your cholesterol was a bit high. Your estimated 10 year risk of a heart attack or stroke is 24.4%. Statin drugs are recommended at 7.5% or greater.     Previously I had prescribed Lipitor 20 mg but you were not interested in it. At our clinic visit you told me you would consider taking it if the cholesterol was higher. As you can see below the LDL (bad cholesterol) improved from 137 to 106 and HDL (the good cholesterol) improved from 50 to 54. My guess is you don't want the medication but if you change your mind, let me know.    All other results were fine.    Regards,    Srinivasa Berumen M.D.    The 10-year ASCVD risk score (Seng HARRIS, et al., 2019) is: 24.4%    Values used to calculate the score:      Age: 71 years      Sex: Male      Is Non- : No      Diabetic: No      Tobacco smoker: No      Systolic Blood Pressure: 136 mmHg      Is BP treated: Yes      HDL Cholesterol: 54 mg/dL      Total Cholesterol: 182 mg/dL    Lab Test        11/03/22     02/06/20     01/20/16     02/03/15                       0745          0954          0947          0755          CHOL         182          201*           < >        152           HDL          54           50             < >        37*           LDL          106*         137*           < >        91            TRIG         109          72             < >        120           CHOLHDLRATIO  --           --           --          4.1            < > = values in this interval not displayed.

## 2022-11-10 ENCOUNTER — TELEPHONE (OUTPATIENT)
Dept: GASTROENTEROLOGY | Facility: CLINIC | Age: 72
End: 2022-11-10

## 2022-11-10 NOTE — TELEPHONE ENCOUNTER
Screening Questions  BLUE  KIND OF PREP RED  LOCATION [review exclusion criteria] GREEN  SEDATION TYPE        N Are you active on mychart?       [place] Ordering/Referring Provider?        BCBS What type of coverage do you have?      N Have you had a positive covid test in the last 90 days?     26.9 1. BMI  [BMI 40+ - review exclusion criteria]    Yes  2. Are you able to give consent for your medical care? [IF NO,RN REVIEW]        N  3. Are you taking any prescription pain medications on a routine schedule?      NA  3a. EXTENDED PREP What kind of prescription?     N 4. Do you have any chemical dependencies such as alcohol, street drugs, or methadone?    N 5. Do you have any history of post-traumatic stress syndrome, severe anxiety or history of psychosis?      **If yes 3- 5 , please schedule with MAC sedation.**          IF YES TO ANY 6 - 10 - HOSPITAL SETTING ONLY.     N 6.   Do you need assistance transferring?     N 7.   Have you had a heart or lung transplant?    N 8.   Are you currently on dialysis?   N 9.   Do you use daily home oxygen?   N 10. Do you take nitroglycerin?   10a. NA If yes, how often?     11. [FEMALES]  NA Are you currently pregnant?    11a. NA If yes, how many weeks? [ Greater than 12 weeks, OR NEEDED]    N 12. Do you have Pulmonary Hypertension? *NEED PAC APPT AT [facility]*     N 13. [review exclusion criteria]  Do you have any implantable devices in your body (pacemaker, defib, LVAD)?    N 14. In the past 6 months, have you had any heart related issues including cardiomyopathy or heart attack?     14a. N If yes, did it require cardiac stenting if so when?     N 15. Have you had a stroke or Transient ischemic attack (TIA - aka  mini stroke ) within 6 months?      N 16. Do you have mod to severe Obstructive Sleep Apnea?  [Hospital only - Ok at [place]]    N 17. Do you have SEVERE AND UNCONTROLLED asthma? *NEED PAC APPT AT [facility]*     N 18. Are you currently taking any blood thinners?     18a.  "If yes, inform patient to \"follow up w/ ordering provider for bridging instructions.\"    N 19. Do you take the medication Phentermine?    19a. If yes, \"Hold for 7 days before procedure.  Please consult your prescribing provider if you have questions about holding this medication.\"     N  20. Do you have chronic kidney disease?      N  21. Do you have a diagnosis of diabetes?     N  22. On a regular basis do you go 3-5 days between bowel movements?      23. Preferred LOCAL Pharmacy for Pre Prescription    [ LIST ONLY ONE PHARMACY]     [place] - [address] [address], [address]        - CLOSING REMINDERS -    Informed patient they will need an adult    Cannot take any type of public or medical transportation alone    Conscious Sedation- Needs  for 6 hours after the procedure       MAC/General-Needs  for 24 hours after procedure    Pre-Procedure Covid test to be completed [[facility] PCR Testing Required]    Confirmed Nurse will call to complete assessment       - SCHEDULING DETAILS -     Juan Ramon  Surgeon    1/30/23  Date of Procedure  Lower Endoscopy [Colonoscopy]  Type of Procedure Scheduled    Location  [facility]-If you answer yes to questions #8, #20, #21Which Colonoscopy Prep was Sent?     CS Sedation Type     NO PAC / Pre-op Required         Additional comments:            "

## 2023-01-19 ENCOUNTER — TELEPHONE (OUTPATIENT)
Dept: GASTROENTEROLOGY | Facility: CLINIC | Age: 73
End: 2023-01-19

## 2023-01-19 DIAGNOSIS — Z12.11 ENCOUNTER FOR SCREENING COLONOSCOPY: Primary | ICD-10-CM

## 2023-01-19 RX ORDER — BISACODYL 5 MG/1
TABLET, DELAYED RELEASE ORAL
Qty: 4 TABLET | Refills: 0 | Status: SHIPPED | OUTPATIENT
Start: 2023-01-19

## 2023-01-19 NOTE — TELEPHONE ENCOUNTER
Attempted to contact patient regarding upcoming Colonoscopy  procedure on 1.30.2023 for pre assessment questions. No answer.     Left message to return call to [phone] #4    Discuss Covid policy.     Pre op exam scheduled: N/A    Arrival time: 0830    Facility location: [facility] [place]; [address], [place] [address]    Sedation type: MAC    Anticoagulants: No    Electronic implanted devices? No    Diabetic? No    Indication for procedure: screening colonoscopy and hx polyps    Bowel prep recommendation: Standard Golytely  d/t mg citrate recall    Prep instructions sent via Letter at time of scheduling.   Bowel prep script sent to [facility] [address] [address], [address]      Mya Dooley RN  Endoscopy Procedure Pre Assessment RN

## 2023-01-20 NOTE — TELEPHONE ENCOUNTER
Pre assessment questions completed for upcoming Colonoscopy  procedure scheduled on 1.30.23    COVID policy reviewed.     Reviewed procedural arrival time 0830 and facility location [facility] [place]; [address], [place] [address]    Designated  policy reviewed. Instructed to have someone stay 24 hours post procedure.     Anticoagulation/blood thinners? No    Electronic implanted devices? No    Diabetic? No    Reviewed standard golytely procedure prep instructions.     Patient verbalized understanding and had no questions or concerns at this time.    Alley Acuña RN  Endoscopy Procedure Pre Assessment RN

## 2023-01-29 ENCOUNTER — ANESTHESIA EVENT (OUTPATIENT)
Dept: SURGERY | Facility: AMBULATORY SURGERY CENTER | Age: 73
End: 2023-01-29
Payer: COMMERCIAL

## 2023-01-30 ENCOUNTER — ANESTHESIA (OUTPATIENT)
Dept: SURGERY | Facility: AMBULATORY SURGERY CENTER | Age: 73
End: 2023-01-30
Payer: COMMERCIAL

## 2023-01-30 ENCOUNTER — HOSPITAL ENCOUNTER (OUTPATIENT)
Facility: AMBULATORY SURGERY CENTER | Age: 73
Discharge: HOME | End: 2023-01-30
Attending: INTERNAL MEDICINE
Payer: COMMERCIAL

## 2023-01-30 VITALS
DIASTOLIC BLOOD PRESSURE: 86 MMHG | RESPIRATION RATE: 16 BRPM | TEMPERATURE: 97.8 F | SYSTOLIC BLOOD PRESSURE: 141 MMHG | OXYGEN SATURATION: 97 %

## 2023-01-30 VITALS — HEART RATE: 65 BPM

## 2023-01-30 LAB — COLONOSCOPY: NORMAL

## 2023-01-30 PROCEDURE — 88305 TISSUE EXAM BY PATHOLOGIST: CPT | Performed by: PATHOLOGY

## 2023-01-30 PROCEDURE — 45380 COLONOSCOPY AND BIOPSY: CPT | Mod: XS

## 2023-01-30 PROCEDURE — G8907 PT DOC NO EVENTS ON DISCHARG: HCPCS

## 2023-01-30 PROCEDURE — 45385 COLONOSCOPY W/LESION REMOVAL: CPT

## 2023-01-30 PROCEDURE — G8918 PT W/O PREOP ORDER IV AB PRO: HCPCS

## 2023-01-30 RX ORDER — PROCHLORPERAZINE MALEATE 5 MG
5 TABLET ORAL EVERY 6 HOURS PRN
Status: DISCONTINUED | OUTPATIENT
Start: 2023-01-30 | End: 2023-01-31 | Stop reason: HOSPADM

## 2023-01-30 RX ORDER — ONDANSETRON 2 MG/ML
4 INJECTION INTRAMUSCULAR; INTRAVENOUS
Status: DISCONTINUED | OUTPATIENT
Start: 2023-01-30 | End: 2023-01-31 | Stop reason: HOSPADM

## 2023-01-30 RX ORDER — ONDANSETRON 4 MG/1
4 TABLET, ORALLY DISINTEGRATING ORAL EVERY 6 HOURS PRN
Status: DISCONTINUED | OUTPATIENT
Start: 2023-01-30 | End: 2023-01-31 | Stop reason: HOSPADM

## 2023-01-30 RX ORDER — NALOXONE HYDROCHLORIDE 0.4 MG/ML
0.4 INJECTION, SOLUTION INTRAMUSCULAR; INTRAVENOUS; SUBCUTANEOUS
Status: DISCONTINUED | OUTPATIENT
Start: 2023-01-30 | End: 2023-01-31 | Stop reason: HOSPADM

## 2023-01-30 RX ORDER — NALOXONE HYDROCHLORIDE 0.4 MG/ML
0.2 INJECTION, SOLUTION INTRAMUSCULAR; INTRAVENOUS; SUBCUTANEOUS
Status: DISCONTINUED | OUTPATIENT
Start: 2023-01-30 | End: 2023-01-31 | Stop reason: HOSPADM

## 2023-01-30 RX ORDER — ONDANSETRON 2 MG/ML
4 INJECTION INTRAMUSCULAR; INTRAVENOUS EVERY 6 HOURS PRN
Status: DISCONTINUED | OUTPATIENT
Start: 2023-01-30 | End: 2023-01-31 | Stop reason: HOSPADM

## 2023-01-30 RX ORDER — LIDOCAINE 40 MG/G
CREAM TOPICAL
Status: DISCONTINUED | OUTPATIENT
Start: 2023-01-30 | End: 2023-01-31 | Stop reason: HOSPADM

## 2023-01-30 RX ORDER — SODIUM CHLORIDE, SODIUM LACTATE, POTASSIUM CHLORIDE, CALCIUM CHLORIDE 600; 310; 30; 20 MG/100ML; MG/100ML; MG/100ML; MG/100ML
INJECTION, SOLUTION INTRAVENOUS CONTINUOUS
Status: DISCONTINUED | OUTPATIENT
Start: 2023-01-30 | End: 2023-01-31 | Stop reason: HOSPADM

## 2023-01-30 RX ORDER — LIDOCAINE HYDROCHLORIDE 20 MG/ML
INJECTION, SOLUTION INFILTRATION; PERINEURAL PRN
Status: DISCONTINUED | OUTPATIENT
Start: 2023-01-30 | End: 2023-01-30

## 2023-01-30 RX ORDER — FLUMAZENIL 0.1 MG/ML
0.2 INJECTION, SOLUTION INTRAVENOUS
Status: ACTIVE | OUTPATIENT
Start: 2023-01-30 | End: 2023-01-30

## 2023-01-30 RX ORDER — PROPOFOL 10 MG/ML
INJECTION, EMULSION INTRAVENOUS CONTINUOUS PRN
Status: DISCONTINUED | OUTPATIENT
Start: 2023-01-30 | End: 2023-01-30

## 2023-01-30 RX ORDER — PROPOFOL 10 MG/ML
INJECTION, EMULSION INTRAVENOUS PRN
Status: DISCONTINUED | OUTPATIENT
Start: 2023-01-30 | End: 2023-01-30

## 2023-01-30 RX ADMIN — PROPOFOL 70 MG: 10 INJECTION, EMULSION INTRAVENOUS at 08:52

## 2023-01-30 RX ADMIN — SODIUM CHLORIDE, SODIUM LACTATE, POTASSIUM CHLORIDE, CALCIUM CHLORIDE: 600; 310; 30; 20 INJECTION, SOLUTION INTRAVENOUS at 08:41

## 2023-01-30 RX ADMIN — LIDOCAINE HYDROCHLORIDE 60 MG: 20 INJECTION, SOLUTION INFILTRATION; PERINEURAL at 08:51

## 2023-01-30 RX ADMIN — PROPOFOL 150 MCG/KG/MIN: 10 INJECTION, EMULSION INTRAVENOUS at 08:52

## 2023-01-30 ASSESSMENT — ENCOUNTER SYMPTOMS: SEIZURES: 0

## 2023-01-30 ASSESSMENT — LIFESTYLE VARIABLES: TOBACCO_USE: 0

## 2023-01-30 NOTE — H&P
ENDOSCOPY PRE-SEDATION H&P FOR OUTPATIENT PROCEDURES    Duane T Nienow  [id number]  1950    Procedure: colonoscopy    Pre-procedure diagnosis: hx polyps    Past medical history:   Past Medical History:   Diagnosis Date     Hypertension        Past surgical history:   Past Surgical History:   Procedure Laterality Date     COLONOSCOPY WITH CO2 INSUFFLATION N/A 11/7/2017    Procedure: COLONOSCOPY WITH CO2 INSUFFLATION;  COLON SCREEN/ OMID;  Surgeon: Graham Cerda MD;  Location: [facility] OR     ORTHOPEDIC SURGERY      michell. bunionectomy       Current Outpatient Medications   Medication     amLODIPine (NORVASC) 5 MG tablet     bisacodyl (DULCOLAX) 5 MG EC tablet     polyethylene glycol (GOLYTELY) 236 g suspension     Multiple Vitamin (MULTI-VITAMIN DAILY PO)     Current Facility-Administered Medications   Medication     lactated ringers infusion     lidocaine (LMX4) kit     lidocaine (LMX4) kit     lidocaine 1 % 0.1-1 mL     lidocaine 1 % 0.1-1 mL     ondansetron (ZOFRAN) injection 4 mg     sodium chloride (PF) 0.9% PF flush 3 mL     sodium chloride (PF) 0.9% PF flush 3 mL     sodium chloride (PF) 0.9% PF flush 3 mL     sodium chloride (PF) 0.9% PF flush 3 mL       No Known Allergies    History of Anesthesia/Sedation Problems: no    Physical Exam:    Mental status: alert  Heart: Normal  Lung: Normal  Assessment of patient's airway: Normal  Other as pertinent for procedure: None     ASA Score: See Provation note    Mallampati score:  I - Faucial pillars, soft palate, and uvula are visible    Assessment/Plan:     The patient is an appropriate candidate to receive sedation.    Informed consent was discussed with the patient/family, including the risks, benefits, potential complications and any alternative options associated with sedation.    Patient assessment completed just prior to sedation and while under constant observation by the provider. Condition determined to be adequate for proceeding with  sedation.    The specific risks for the procedure were discussed with the patient at the time of informed consent and include but are not limited to perforation which could require surgery, missing significant neoplasm or lesion, hemorrhage and adverse sedative complication.      Kapil Delatorre MD

## 2023-01-30 NOTE — ANESTHESIA PREPROCEDURE EVALUATION
Anesthesia Pre-Procedure Evaluation    Patient: Duane T Nienow   MRN: [id number] : 1950        Procedure : Procedure(s):  COLONOSCOPY, WITH CO2 INSUFFLATION  COLONOSCOPY, WITH POLYPECTOMY AND BIOPSY  COLONOSCOPY, FLEXIBLE, WITH LESION REMOVAL USING SNARE          Past Medical History:   Diagnosis Date     Hypertension       Past Surgical History:   Procedure Laterality Date     COLONOSCOPY WITH CO2 INSUFFLATION N/A 2017    Procedure: COLONOSCOPY WITH CO2 INSUFFLATION;  COLON SCREEN/ OMID;  Surgeon: Graham Cerda MD;  Location: [facility]     ORTHOPEDIC SURGERY      michell. bunionectomy      No Known Allergies   Social History     Tobacco Use     Smoking status: Former     Types: Cigarettes     Quit date: 1972     Years since quittin.6     Smokeless tobacco: Never   Substance Use Topics     Alcohol use: Yes     Comment: 3 drinks a week      Wt Readings from Last 1 Encounters:   22 92.5 kg (204 lb)        Anesthesia Evaluation   Pt has had prior anesthetic.         ROS/MED HX  ENT/Pulmonary:    (-) tobacco use   Neurologic:    (-) no seizures, no CVA and Delerium   Cardiovascular:     (+) Dyslipidemia hypertension----- (-) murmur   METS/Exercise Tolerance: >4 METS    Hematologic:    (-) anemia   Musculoskeletal:    (-) arthritis   GI/Hepatic:     (+) bowel prep,  (-) liver disease   Renal/Genitourinary:    (-) renal disease   Endo:    (-) Type II DM   Psychiatric/Substance Use:    (-) alcohol abuse history   Infectious Disease:    (-) Recent Fever   Malignancy:       Other:            Physical Exam    Airway        Mallampati: II   TM distance: > 3 FB   Neck ROM: full   Mouth opening: > 3 cm    Respiratory Devices and Support         Dental       (+) Minor Abnormalities - some fillings, tiny chips      Cardiovascular          Rhythm and rate: regular and normal (-) no murmur    Pulmonary   pulmonary exam normal        breath sounds clear to auscultation           OUTSIDE LABS:  CBC:    Lab Results   Component Value Date    WBC 8.8 06/23/2022    WBC 6.9 01/20/2016    HGB 13.7 06/23/2022    HGB 14.5 01/20/2016    HCT 39.8 (L) 06/23/2022    HCT 41.7 01/20/2016     06/23/2022     01/20/2016     BMP:   Lab Results   Component Value Date     11/03/2022     02/06/2020    POTASSIUM 3.9 11/03/2022    POTASSIUM 4.1 02/06/2020    CHLORIDE 110 (H) 11/03/2022    CHLORIDE 111 (H) 02/06/2020    CO2 27 11/03/2022    CO2 23 02/06/2020    BUN 16 11/03/2022    BUN 18 02/06/2020    CR 1.02 11/03/2022    CR 1.09 02/06/2020    GLC 99 11/03/2022    GLC 93 02/06/2020     COAGS: No results found for: PTT, INR, FIBR  POC: No results found for: BGM, HCG, HCGS  HEPATIC: No results found for: ALBUMIN, PROTTOTAL, ALT, AST, GGT, ALKPHOS, BILITOTAL, BILIDIRECT, ORI  OTHER:   Lab Results   Component Value Date    JUSTIN 8.9 11/03/2022    TSH 1.56 02/03/2015       Anesthesia Plan    ASA Status:  2   NPO Status:  NPO Appropriate    Anesthesia Type: MAC.     - Reason for MAC: immobility needed   Induction: Intravenous, Propofol.   Maintenance: TIVA.        Consents    Anesthesia Plan(s) and associated risks, benefits, and realistic alternatives discussed. Questions answered and patient/representative(s) expressed understanding.    - Discussed:     - Discussed with:  Patient      - Extended Intubation/Ventilatory Support Discussed: No.      - Patient is DNR/DNI Status: No    Use of blood products discussed: No .     Postoperative Care    Pain management: IV analgesics.   PONV prophylaxis: Ondansetron (or other 5HT-3), Background Propofol Infusion     Comments:    Other Comments: Discussed plan for IV anesthetic with native airway. Discussed potential need for conversion to general anesthetic with airway management and potential for recall.         H&P reviewed: Unable to attach H&P to encounter due to EHR limitations. H&P Update: appropriate H&P reviewed, patient examined. No interval changes since H&P  (within 30 days).         Martín Blake MD

## 2023-01-30 NOTE — ANESTHESIA POSTPROCEDURE EVALUATION
Patient: Duane T Nienow    Procedure: Procedure(s):  COLONOSCOPY, WITH CO2 INSUFFLATION  COLONOSCOPY, WITH POLYPECTOMY AND BIOPSY  COLONOSCOPY, FLEXIBLE, WITH LESION REMOVAL USING SNARE       Anesthesia Type:  MAC    Note:  Disposition: Outpatient   Postop Pain Control: Uneventful            Sign Out: Well controlled pain   PONV: No   Neuro/Psych: Uneventful            Sign Out: Acceptable/Baseline neuro status   Airway/Respiratory: Uneventful            Sign Out: Acceptable/Baseline resp. status   CV/Hemodynamics: Uneventful            Sign Out: Acceptable CV status; No obvious hypovolemia; No obvious fluid overload   Other NRE:    DID A NON-ROUTINE EVENT OCCUR? No           Last vitals:  Vitals Value Taken Time   /86 01/30/23 0938   Temp     Pulse     Resp 16 01/30/23 0938   SpO2 97 % 01/30/23 0938       Electronically Signed By: Martín Blake MD  January 30, 2023  11:19 AM

## 2023-01-30 NOTE — LETTER
"February 2, 2023      Duane T Nienow  [address] [facility] [address]        Dear ,    I am writing to let you know the results of the polyps that were removed at the time of your colonoscopy. The polyps returned as \"adenomatous polyps\". An adenoma is a type of polyp that if left in the colon over a long enough period of time, and under the right circumstances, it could develop into a colon cancer. There was no cancer in your polyps. Your polyps were completely removed, however you are at risk to grow more adenomatous polyps in the future.      Though continuation of colonoscopy surviellance is not universal after age 75, I recommend that you consider a repeat colonoscopy to screen for new polyps in three (3) years if your health remains good and you wish to continue with the surveillance program. Please review these findings and recommendations with your primary care provider. Of course should you develop any symptoms (such as unintended weight loss, blood in your stool or change in bowel pattern), you should let myself or your primary care doctor know as you may need an earlier procedure.      If you have any questions, please don't hesitate to contact the GI care coordinator at [phone].     Sincerely,    Kapil Delatorre MD  [facility]  Gastroenterology and Hepatology  [facility] Adjunct                       Resulted Orders   Surgical Pathology Exam   Result Value Ref Range    Case Report       Surgical Pathology Report                         Case: [id number]                                  Authorizing Provider:  Kapil Delatorre      Collected:           01/30/2023 09:04 AM                                 MD Giuliano                                                                     Ordering Location:     [facility]    Received:            01/30/2023 12:15 PM                                 Gokul PALM              " "                                                       Pathologist:           Kristopher Carr DO                                                            Specimens:   A) - Large Intestine, Colon, Ascending, polpys x 2                                                  B) - Large Intestine, Colon, Transverse, Colon polyp                                                C) - Large Intestine, Colon, Descending, Colon polyp                                                D) - Rectum, Rectal polyp                                                                  Final Diagnosis       A.  COLON, ASCENDING, POLYPS:  - Tubular adenoma  - No high-grade dysplasia or malignancy identified  - Colonic mucosa with a benign lymphoid aggregate    B.  COLON, TRANSVERSE, POLYP:  - Tubular adenoma  - No high-grade dysplasia or malignancy identified    C.  COLON, DESCENDING, POLYP:  - Tubular adenoma  - No high-grade dysplasia or malignancy identified    D.  RECTUM, POLYP:  - Tubular adenoma  - No high-grade dysplasia or malignancy identified        Clinical Information       Screen for colon cancer      Gross Description       A(1). Large Intestine, Colon, Ascending, polpys x 2:  The specimen is received in formalin with proper patient identification, labeled \"colon ascending polyps x2\".  The specimen consists of multiple tan-white soft tissue fragments ranging from 0.1 to 0.5 cm in greatest dimension.  Submitted in A1.   B(2). Large Intestine, Colon, Transverse, Colon polyp:  The specimen is received in formalin with proper patient identification, labeled \"colon transverse colon polyp\".  The specimen consists of a 0.5 x 0.5 x 0.3 cm piece of tan-brown, polypoid tissue.  It is received with additional pieces of tan-white soft tissue.  The polypoid fragment is submitted in B1 and the remaining aggregate is submitted in B2.   C(3). Large Intestine, Colon, Descending, Colon polyp:  The specimen is received in formalin with proper patient " "identification, labeled \"colon descending colon polyp\".  The specimen consists of 2 pieces of tan-white soft tissue measuring 0.5 and 0.6 cm in greatest dimension.  Submitted in C1.   D(4). Rectum, Rectal polyp:  The specimen is received in formalin with proper patient identification, labeled \"rectal polyp\".  The specimen consists of a 0.6 cm tan-brown soft tissue fragment.  Submitted in D1.       Microscopic Description       Microscopic examination was performed.        Performing Labs       The technical component of this testing was completed at [facility]      Case Images       "

## 2023-01-30 NOTE — ANESTHESIA CARE TRANSFER NOTE
Patient: Duane T Nienow    Procedure: Procedure(s):  COLONOSCOPY, WITH CO2 INSUFFLATION  COLONOSCOPY, WITH POLYPECTOMY AND BIOPSY  COLONOSCOPY, FLEXIBLE, WITH LESION REMOVAL USING SNARE       Diagnosis: Screen for colon cancer [Z12.11]  Diagnosis Additional Information: No value filed.    Anesthesia Type:   MAC     Note:      Level of Consciousness: awake  Oxygen Supplementation: room air    Independent Airway: airway patency satisfactory and stable  Dentition: dentition unchanged  Vital Signs Stable: post-procedure vital signs reviewed and stable  Report to RN Given: handoff report given  Patient transferred to: Phase II    Handoff Report: Identifed the Patient, Identified the Reponsible Provider, Reviewed the pertinent medical history, Discussed the surgical course, Reviewed Intra-OP anesthesia mangement and issues during anesthesia, Set expectations for post-procedure period and Allowed opportunity for questions and acknowledgement of understanding      Vitals:  Vitals Value Taken Time   /76    Temp 97.2    Pulse 65 01/30/23 0918   Resp 10    SpO2 97        Electronically Signed By: NICOLE Crabtree CRNA  January 30, 2023  9:22 AM

## 2023-02-02 LAB
PATH REPORT.COMMENTS IMP SPEC: NORMAL
PATH REPORT.COMMENTS IMP SPEC: NORMAL
PATH REPORT.FINAL DX SPEC: NORMAL
PATH REPORT.GROSS SPEC: NORMAL
PATH REPORT.MICROSCOPIC SPEC OTHER STN: NORMAL
PATH REPORT.RELEVANT HX SPEC: NORMAL
PHOTO IMAGE: NORMAL

## 2023-05-25 NOTE — PROGRESS NOTES
[facility] Dermatology Note  Encounter Date: May 31, 2023  Office Visit     Dermatology Problem List:  Last skin check: 5/31/2023  1. AK - cryo  2. Lesion to monitor-7x3mm flesh colored shiny lesion-vertex scalp - photo 5/31/23  ____________________________________________    Assessment & Plan:    # Actinic keratosis - left dorsal hand x1. Based on the location and chronic irritation to this lesion, treatment with cryotherapy is warranted.   - See cryo procedure note.    # Multiple clinically benign nevi on the trunk and extremities. No treatment is necessary at this time unless the lesion changes or becomes symptomatic.    - ABCDEs of melanoma were discussed and self skin checks were advised.    # Seborrheic keratosis, non irritated on the trunk and extremities. Explained to patient benign nature of lesion. No treatment is necessary at this time unless the lesion changes or becomes symptomatic.     - Monitor for changes.    # Cherry Angiomas - trunk and extremities. Explained to patient benign nature of lesion. No treatment is necessary at this time unless the lesion changes or becomes symptomatic.      # Solar lentigines on the sun exposed skin areas. Benign nature was discussed. No further intervention required at this time.    - Sun precaution was advised including the use of sun screens of SPF 30 or higher, sun protective clothing, and avoidance of tanning beds.      #Lesion to monitor-vertex scalp. Possible SK.   -Monitor on follow up.   -Photograph was obtained for clinical monitoring and inclusion in medical record.      Procedures Performed:   - Cryotherapy procedure note, location(s): left dorsal hand x1. After verbal consent and discussion of risks and benefits including, but not limited to, dyspigmentation/scar, blister, and pain, 1 AK(s) was(were) treated with 1-2 mm freeze border for 1-2 cycles with liquid nitrogen. Post cryotherapy instructions were provided.     Follow-up: 1  year(s) in-person, or earlier for new or changing lesions    Staff and Scribe:     Scribe Disclosure:   I, Melanie Elinor, am serving as a scribe to document services personally performed by this physician, Zulema Parks PA-C, based on data collection and the provider's statements to me.   Provider Disclosure:   The documentation recorded by the scribe accurately reflects the services I personally performed and the decisions made by me.    All risks, benefits and alternatives were discussed with patient.  Patient is in agreement and understands the assessment and plan.  All questions were answered.    Zulema Parks PA-C, [facility]  [facility]: Phone: [phone], Fax: [phone]  [facility]: Phone: [phone],  Fax: [phone]  [facility]: Phone: [phone], Fax: [phone]  ____________________________________________    CC: Skin Check (Patient here for a skin check, areas of concern couple face,legs and back. )    HPI:  Mr. Duane T Nienow is a(n) 72 year old male who presents today as a new patient for a skin check. Pt reports no concerning lesions at this time.     Referred by Dr. Bernabe on 11/03/2022 for an SK.     Patient is otherwise feeling well, without additional skin concerns.    Labs Reviewed:  N/A    Physical Exam:  Vitals: There were no vitals taken for this visit.  SKIN: Total skin excluding the undergarment areas was performed. The exam included the head/face, neck, both arms, chest, back, abdomen, both legs, digits and/or nails.   - Sahni type I-II.  - There are erythematous macules with overyling adherent scale on the left dorsal hand x1.    - There are dome shaped bright red papules on the trunk and extremities.   - Multiple regular brown pigmented macules and papules are identified on the trunk and extremities, <100.  - Scattered brown macules on sun exposed areas.  -  There are waxy stuck on tan to brown papules on the trunk and extremities.   -7x3mm flesh colored shiny lesion-vertex scalp    - No other lesions of concern on areas examined.               Medications:  Current Outpatient Medications   Medication     Multiple Vitamin (MULTI-VITAMIN DAILY PO)     amLODIPine (NORVASC) 5 MG tablet     bisacodyl (DULCOLAX) 5 MG EC tablet     polyethylene glycol (GOLYTELY) 236 g suspension     No current facility-administered medications for this visit.      Past Medical History:   Patient Active Problem List   Diagnosis     Advanced directives, counseling/discussion     Fracture of metatarsal of left foot, closed     Snoring     Daytime somnolence     Elevated PSA     Essential hypertension with goal blood pressure less than 140/90     Dyslipidemia     SK (seborrheic keratosis)     Past Medical History:   Diagnosis Date     Hypertension

## 2023-05-31 ENCOUNTER — OFFICE VISIT (OUTPATIENT)
Dept: DERMATOLOGY | Facility: CLINIC | Age: 73
End: 2023-05-31
Payer: COMMERCIAL

## 2023-05-31 DIAGNOSIS — D18.01 CHERRY ANGIOMA: Primary | ICD-10-CM

## 2023-05-31 DIAGNOSIS — L82.1 SEBORRHEIC KERATOSIS: ICD-10-CM

## 2023-05-31 DIAGNOSIS — L81.4 SOLAR LENTIGO: ICD-10-CM

## 2023-05-31 DIAGNOSIS — L57.0 ACTINIC KERATOSIS: ICD-10-CM

## 2023-05-31 DIAGNOSIS — D22.9 MULTIPLE BENIGN NEVI: ICD-10-CM

## 2023-05-31 DIAGNOSIS — L82.1 SK (SEBORRHEIC KERATOSIS): ICD-10-CM

## 2023-05-31 PROCEDURE — 99203 OFFICE O/P NEW LOW 30 MIN: CPT | Mod: 25 | Performed by: PHYSICIAN ASSISTANT

## 2023-05-31 PROCEDURE — 17000 DESTRUCT PREMALG LESION: CPT | Performed by: PHYSICIAN ASSISTANT

## 2023-05-31 NOTE — PATIENT INSTRUCTIONS
Patient Education     Checking for Skin Cancer  You can find cancer early by checking your skin each month. There are 3 kinds of skin cancer. They are melanoma, basal cell carcinoma, and squamous cell carcinoma. Doing monthly skin checks is the best way to find new marks or skin changes. Follow the instructions below for checking your skin.   The ABCDEs of checking moles for melanoma   Check your moles or growths for signs of melanoma using ABCDE:   Asymmetry: the sides of the mole or growth don t match  Border: the edges are ragged, notched, or blurred  Color: the color within the mole or growth varies  Diameter: the mole or growth is larger than 6 mm (size of a pencil eraser)  Evolving: the size, shape, or color of the mole or growth is changing (evolving is not shown in the images below)    Checking for other types of skin cancer  Basal cell carcinoma or squamous cell carcinoma have symptoms such as:     A spot or mole that looks different from all other marks on your skin  Changes in how an area feels, such as itching, tenderness, or pain  Changes in the skin's surface, such as oozing, bleeding, or scaliness  A sore that does not heal  New swelling or redness beyond the border of a mole    Who s at risk?  Anyone can get skin cancer. But you are at greater risk if you have:   Fair skin, light-colored hair, or light-colored eyes  Many moles or abnormal moles on your skin  A history of sunburns from sunlight or tanning beds  A family history of skin cancer  A history of exposure to radiation or chemicals  A weakened immune system  If you have had skin cancer in the past, you are at risk for recurring skin cancer.   How to check your skin  Do your monthly skin checkups in front of a full-length mirror. Check all parts of your body, including your:   Head (ears, face, neck, and scalp)  Torso (front, back, and sides)  Arms (tops, undersides, upper, and lower armpits)  Hands (palms, backs, and fingers, including  under the nails)  Buttocks and genitals  Legs (front, back, and sides)  Feet (tops, soles, toes, including under the nails, and between toes)  If you have a lot of moles, take digital photos of them each month. Make sure to take photos both up close and from a distance. These can help you see if any moles change over time.   Most skin changes are not cancer. But if you see any changes in your skin, call your doctor right away. Only he or she can diagnose a problem. If you have skin cancer, seeing your doctor can be the first step toward getting the treatment that could save your life.   [employer] last reviewed this educational content on 4/1/2019 2000-2020 [employer]. [address] [place] [address], [place] [address]. All rights reserved. This information is not intended as a substitute for professional medical care. Always follow your healthcare professional's instructions.       When should I call my doctor?  If you are worsening or not improving, please, contact us or seek urgent care as noted below.     Who should I call with questions (adults)?  [facility] (adult and pediatric): [phone]  [facility] (adult): [phone]  For urgent needs outside of business hours call the [facility] at [phone] and ask for the dermatology resident on call to be paged  If this is a medical emergency and you are unable to reach an ER, Call [phone]    Who should I call with questions (pediatric)?  [facility]- Pediatric Dermatology  Dr. Esther Akhtar, Dr. Luciano Cooper, Dr. Linda Randall, KRISTEN Donaldson, Dr. Ethel Cook, Dr. Grace Joyner & Dr. Nimesh Dimas  Non-urgent nurse triage line; [phone]- Abbie and Cristy MORA Care Coordinatorcesar Orourke (/Complex ) [phone]    If you need a prescription refill, please contact your pharmacy. Refills are approved or denied by our  Physicians during normal business hours, Monday through Fridays  Per office policy, refills will not be granted if you have not been seen within the past year (or sooner depending on your child's condition)    Scheduling Information:  Pediatric Appointment Scheduling and Call Center [phone]  Radiology Scheduling- [phone]  Sedation Unit Scheduling- [phone]  [place] Scheduling- General [phone]; Pediatric Dermatology [phone]  Main  Services: [phone]  Lithuanian: [phone]  Swazi: [phone]  Hmong/Ukrainian/Turkish: [phone]  Preadmission Nursing Department Fax Number: [phone] (Fax all pre-operative paperwork to this number)    For urgent matters arising during evenings, weekends, or holidays that cannot wait for normal business hours please call [phone] and ask for the dermatology resident on call to be paged.

## 2023-05-31 NOTE — NURSING NOTE
Duane T Nienow's goals for this visit include:   Chief Complaint   Patient presents with     Skin Check     Patient here for a skin check, areas of concern couple face,legs and back.        He requests these members of his care team be copied on today's visit information:     PCP: Srinivasa Berumen    Referring Provider:  Srinivasa Berumen MD  [address] [place] [address]    There were no vitals taken for this visit.    Do you need any medication refills at today's visit? Pamela MOSS

## 2023-05-31 NOTE — LETTER
5/31/2023         RE: Duane T Nienow  [address] [facility] [address]        Dear Colleague,    Thank you for referring your patient, Duane T Nienow, to the [facility]. Please see a copy of my visit note below.    [facility] Dermatology Note  Encounter Date: May 31, 2023  Office Visit     Dermatology Problem List:  Last skin check: 5/31/2023  1. AK - cryo  2. Lesion to monitor-7x3mm flesh colored shiny lesion-vertex scalp - photo 5/31/23  ____________________________________________    Assessment & Plan:    # Actinic keratosis - left dorsal hand x1. Based on the location and chronic irritation to this lesion, treatment with cryotherapy is warranted.   - See cryo procedure note.    # Multiple clinically benign nevi on the trunk and extremities. No treatment is necessary at this time unless the lesion changes or becomes symptomatic.    - ABCDEs of melanoma were discussed and self skin checks were advised.    # Seborrheic keratosis, non irritated on the trunk and extremities. Explained to patient benign nature of lesion. No treatment is necessary at this time unless the lesion changes or becomes symptomatic.     - Monitor for changes.    # Cherry Angiomas - trunk and extremities. Explained to patient benign nature of lesion. No treatment is necessary at this time unless the lesion changes or becomes symptomatic.      # Solar lentigines on the sun exposed skin areas. Benign nature was discussed. No further intervention required at this time.    - Sun precaution was advised including the use of sun screens of SPF 30 or higher, sun protective clothing, and avoidance of tanning beds.      #Lesion to monitor-vertex scalp. Possible SK.   -Monitor on follow up.   -Photograph was obtained for clinical monitoring and inclusion in medical record.      Procedures Performed:   - Cryotherapy procedure note, location(s): left dorsal hand x1. After verbal consent and  discussion of risks and benefits including, but not limited to, dyspigmentation/scar, blister, and pain, 1 AK(s) was(were) treated with 1-2 mm freeze border for 1-2 cycles with liquid nitrogen. Post cryotherapy instructions were provided.     Follow-up: 1 year(s) in-person, or earlier for new or changing lesions    Staff and Scribe:     Scribe Disclosure:   I, Melanie Aldrich, am serving as a scribe to document services personally performed by this physician, Zulema Parks PA-C, based on data collection and the provider's statements to me.   Provider Disclosure:   The documentation recorded by the scribe accurately reflects the services I personally performed and the decisions made by me.    All risks, benefits and alternatives were discussed with patient.  Patient is in agreement and understands the assessment and plan.  All questions were answered.    Zulema Parks PA-C, [facility]  [facility] [place]: Phone: [phone], Fax: [phone]  [facility]: Phone: [phone],  Fax: [phone]  [facility]: Phone: [phone], Fax: [phone]  ____________________________________________    CC: Skin Check (Patient here for a skin check, areas of concern couple face,legs and back. )    HPI:  Mr. Duane T Nienow is a(n) 72 year old male who presents today as a new patient for a skin check. Pt reports no concerning lesions at this time.     Referred by Dr. Bernabe on 11/03/2022 for an SK.     Patient is otherwise feeling well, without additional skin concerns.    Labs Reviewed:  N/A    Physical Exam:  Vitals: There were no vitals taken for this visit.  SKIN: Total skin excluding the undergarment areas was performed. The exam included the head/face, neck, both arms, chest, back, abdomen, both legs, digits and/or nails.   - Sahni type I-II.  - There are erythematous macules with overyling adherent scale on the  left dorsal hand x1.    - There are dome shaped bright red papules on the trunk and extremities.   - Multiple regular brown pigmented macules and papules are identified on the trunk and extremities, <100.  - Scattered brown macules on sun exposed areas.  - There are waxy stuck on tan to brown papules on the trunk and extremities.   -7x3mm flesh colored shiny lesion-vertex scalp    - No other lesions of concern on areas examined.               Medications:  Current Outpatient Medications   Medication     Multiple Vitamin (MULTI-VITAMIN DAILY PO)     amLODIPine (NORVASC) 5 MG tablet     bisacodyl (DULCOLAX) 5 MG EC tablet     polyethylene glycol (GOLYTELY) 236 g suspension     No current facility-administered medications for this visit.      Past Medical History:   Patient Active Problem List   Diagnosis     Advanced directives, counseling/discussion     Fracture of metatarsal of left foot, closed     Snoring     Daytime somnolence     Elevated PSA     Essential hypertension with goal blood pressure less than 140/90     Dyslipidemia     SK (seborrheic keratosis)     Past Medical History:   Diagnosis Date     Hypertension        Again, thank you for allowing me to participate in the care of your patient.        Sincerely,        Zulema Parks PA-C

## 2023-08-15 ENCOUNTER — TELEPHONE (OUTPATIENT)
Dept: OPHTHALMOLOGY | Facility: CLINIC | Age: 73
End: 2023-08-15

## 2023-08-15 ENCOUNTER — OFFICE VISIT (OUTPATIENT)
Dept: OPHTHALMOLOGY | Facility: CLINIC | Age: 73
End: 2023-08-15
Payer: COMMERCIAL

## 2023-08-15 DIAGNOSIS — Z98.890 H/O LASER ASSISTED IN SITU KERATOMILEUSIS: ICD-10-CM

## 2023-08-15 DIAGNOSIS — H52.4 HYPEROPIA OF BOTH EYES WITH ASTIGMATISM AND PRESBYOPIA: Primary | ICD-10-CM

## 2023-08-15 DIAGNOSIS — H02.834 DERMATOCHALASIS OF BOTH UPPER EYELIDS: ICD-10-CM

## 2023-08-15 DIAGNOSIS — H02.831 DERMATOCHALASIS OF BOTH UPPER EYELIDS: ICD-10-CM

## 2023-08-15 DIAGNOSIS — H52.03 HYPEROPIA OF BOTH EYES WITH ASTIGMATISM AND PRESBYOPIA: Primary | ICD-10-CM

## 2023-08-15 DIAGNOSIS — H25.813 COMBINED FORMS OF AGE-RELATED CATARACT OF BOTH EYES: ICD-10-CM

## 2023-08-15 DIAGNOSIS — H52.203 HYPEROPIA OF BOTH EYES WITH ASTIGMATISM AND PRESBYOPIA: Primary | ICD-10-CM

## 2023-08-15 PROCEDURE — 92015 DETERMINE REFRACTIVE STATE: CPT | Performed by: OPHTHALMOLOGY

## 2023-08-15 PROCEDURE — 92004 COMPRE OPH EXAM NEW PT 1/>: CPT | Performed by: OPHTHALMOLOGY

## 2023-08-15 ASSESSMENT — CONF VISUAL FIELD
OS_SUPERIOR_NASAL_RESTRICTION: 0
OD_NORMAL: 1
OD_SUPERIOR_TEMPORAL_RESTRICTION: 0
OD_SUPERIOR_NASAL_RESTRICTION: 0
OS_NORMAL: 1
METHOD: COUNTING FINGERS
OS_INFERIOR_NASAL_RESTRICTION: 0
OS_INFERIOR_TEMPORAL_RESTRICTION: 0
OS_SUPERIOR_TEMPORAL_RESTRICTION: 0
OD_INFERIOR_NASAL_RESTRICTION: 0
OD_INFERIOR_TEMPORAL_RESTRICTION: 0

## 2023-08-15 ASSESSMENT — REFRACTION_WEARINGRX
OD_ADD: +2.50
OS_AXIS: 180
OD_AXIS: 180
SPECS_TYPE: PAL
OS_ADD: +2.50
OD_SPHERE: PLANO
OS_SPHERE: PLANO
OS_CYLINDER: +1.00
OD_CYLINDER: +1.25

## 2023-08-15 ASSESSMENT — REFRACTION_MANIFEST
OD_ADD: +2.50
OS_SPHERE: +0.25
OS_AXIS: 005
OD_CYLINDER: +1.50
OS_ADD: +2.50
OS_CYLINDER: +1.25
OD_SPHERE: +0.25
OD_AXIS: 005

## 2023-08-15 ASSESSMENT — VISUAL ACUITY
METHOD: SNELLEN - LINEAR
CORRECTION_TYPE: GLASSES
OS_CC+: -2
OS_CC: 20/20
OS_BAT_HIGH: 20/25-2
OD_BAT_HIGH: 20/25-1
OD_CC: 20/25

## 2023-08-15 ASSESSMENT — TONOMETRY
OS_IOP_MMHG: 20
IOP_METHOD: TONOPEN
OD_IOP_MMHG: 19

## 2023-08-15 ASSESSMENT — SLIT LAMP EXAM - LIDS
COMMENTS: 2+ DERMATOCHALASIS
COMMENTS: 2+ DERMATOCHALASIS

## 2023-08-15 ASSESSMENT — CUP TO DISC RATIO
OS_RATIO: 0.15
OD_RATIO: 0.2

## 2023-08-15 NOTE — PROGRESS NOTES
HPI       Cataract Evaluation    In both eyes.  Associated symptoms include blurred vision, glare and a need for brighter lights.  Onset was gradual.  Affected activities include night driving.             Comments    Here for cataracts evaluation. Last eye exam was about 2 years ago. Vision has gradually worsened since last eye exam in both eyes, left>right. Glare and night driving is bothersome. Notes occasional flashes and floaters. No eye pain.    Angelo Talley COT 1:21 PM August 15, 2023       Hx of LASIK in 1998- myopic prior    denies family history of ocular conditions           Last edited by Alen Galvan MD on 8/15/2023  1:50 PM.         Review of systems for the eyes was negative other than the pertinent positives/negatives listed in the HPI.      Assessment & Plan    HPI:  Duane T Nienow is a 72 year old male with history of HTN, laser in situ keratomileusis (LASIK), presents for exam. Was previously told he had cataracts at [facility] exam. Wears glasses regularly.    POHx: myopic lasik  PMHx: HTN  Current Medications: Multiple Vitamin (MULTI-VITAMIN DAILY PO), Take  by mouth.  amLODIPine (NORVASC) 5 MG tablet, Take 1 tablet (5 mg) by mouth 2 times daily (Patient not taking: Reported on 5/31/2023)  bisacodyl (DULCOLAX) 5 MG EC tablet, Take 2 tablets at 3 pm the day before your procedure. If your procedure is before 11 am, take 2 additional tablets at 11 pm. If your procedure is after 11 am, take 2 additional tablets at 6 am. For additional instructions refer to your colonoscopy prep instructions. (Patient not taking: Reported on 5/31/2023)  polyethylene glycol (GOLYTELY) 236 g suspension, The night before the exam at 6 pm drink an 8-ounce glass every 15 minutes until the jug is half empty. If you arrive before 11 AM: Drink the other half of the Golytely jug at 11 PM night before procedure. If you arrive after 11 AM: Drink the other half of the Golytely jug at 6 AM day of procedure. For additional  instructions refer to your colonoscopy prep instructions. (Patient not taking: Reported on 5/31/2023)    No current facility-administered medications on file prior to visit.    FHx: denies family history of ocular conditions   PSHx: myopic laser in situ keratomileusis (LASIK) 1998      Current Eye Medications:      Assessment & Plan:  (H52.03,  H52.203,  H52.4) Hyperopia of both eyes with astigmatism and presbyopia  (primary encounter diagnosis)  Patient has minimal change in hyperopia but a copy of today's glasses prescription was given.  The patient may wish to update the glasses if the lenses are scratched or the frames are too small.  Presbyopia is difficulty seeing up close and is treated with bifocals or over the counter reading glasses      (Z98.890) H/O laser assisted in situ keratomileusis  1998  Doing well    (H25.813) Combined forms of age-related cataract of both eyes  Mild cataracts are present and may account for some of the patient's visual complaints. No treatment currently recommended. The patient will monitor for vision changes and contact us with any decrease in vision. Recheck in one year.     (H02.831,  H02.834) Dermatochalasis of both upper eyelids  Discussed etiology of disease and possible need for intervention  Observe for now      Return in about 1 year (around 8/15/2024) for Annual Visit-v/t/d/MRx.        Alen Galvan MD     Attending Physician Attestation:  Complete documentation of historical and exam elements from today's encounter can be found in the full encounter summary report (not reduplicated in this progress note).  I personally obtained the chief complaint(s) and history of present illness.  I confirmed and edited as necessary the review of systems, past medical/surgical history, family history, social history, and examination findings as documented by others; and I examined the patient myself.  I personally reviewed the relevant tests, images, and reports as documented  above.  I formulated and edited as necessary the assessment and plan and discussed the findings and management plan with the patient and family. - Alen Galvan MD

## 2023-08-15 NOTE — TELEPHONE ENCOUNTER
Left Voicemail (1st Attempt) for the patient to call back and schedule the following:    Appointment type: return  Provider: dr. trotter  Return date: 8/15/2024  Specialty phone number: [phone]   Additonal Notes: Return in about 1 year (around 8/15/2024) for Annual Visit-v/t/teena/Sandy beal Procedure   Orthopedics, Podiatry, Sports Medicine, Ent ,Eye , Audiology, Adult Endocrine & Diabetes, Nutrition & Medication Therapy Management Specialties   [facility]

## 2023-08-15 NOTE — NURSING NOTE
Chief Complaints and History of Present Illnesses   Patient presents with    Cataract Evaluation     Chief Complaint(s) and History of Present Illness(es)       Cataract Evaluation              Laterality: both eyes    Associated symptoms: blurred vision, glare and a need for brighter lights    Onset: gradual    Activities affected: night driving              Comments    Here for cataracts evaluation. Last eye exam was about 2 years ago. Vision has gradually worsened since last eye exam in both eyes, left>right. Glare and night driving is bothersome. Notes occasional flashes and floaters. No eye pain.    Angelo Talley COT 1:21 PM August 15, 2023     Hx of LASIK in 1998- myopic prior

## 2023-10-10 ENCOUNTER — TELEPHONE (OUTPATIENT)
Dept: FAMILY MEDICINE | Facility: CLINIC | Age: 73
End: 2023-10-10
Payer: COMMERCIAL

## 2023-10-10 NOTE — TELEPHONE ENCOUNTER
Patient Quality Outreach    Patient is due for the following:   Hypertension -  BP check  Physical Annual Wellness Visit  Aortic aneurysm screening      Topic Date Due    COVID-19 Vaccine (1) Never done    Diptheria Tetanus Pertussis (DTAP/TDAP/TD) Vaccine (1 - Tdap) Never done    Zoster (Shingles) Vaccine (1 of 2) Never done    Pneumococcal Vaccine (1 - PCV) Never done    Flu Vaccine (1) Never done       Next Steps:   Schedule a Annual Wellness Visit    Type of outreach:    Sent letter.      Questions for provider review:    None           ISMAEL BOSS MA

## 2023-10-10 NOTE — LETTER
October 10, 2023    To  Duane T Nienow  [address] [facility] [address]    Your team at [facility] cares about your health. We have reviewed your chart and based on our findings; we are making the following recommendations to better manage your health.     You are in particular need of attention regarding the following:     HYPERTENSION FOLLOW UP: Office Visit  Please schedule a Nurse Only Appointment with your primary care clinic to update your immunizations that are due.  PREVENTATIVE VISIT: Annual Medicare Wellness:Schedule an Annual Medicare Wellness Exam. Please call your [facility] clinic to set up your appointment.  OTHER FOLLOW UP: aortic aneurysm screening.    If you have already completed these items, please contact the clinic via phone or   MyChart so your care team can review and update your records. Thank you for   choosing [facility] Clinics for your healthcare needs. For any questions,   concerns, or to schedule an appointment please contact our clinic.    Healthy Regards,      Your [facility] Care Team

## 2023-12-01 ENCOUNTER — OFFICE VISIT (OUTPATIENT)
Dept: AUDIOLOGY | Facility: CLINIC | Age: 73
End: 2023-12-01
Payer: COMMERCIAL

## 2023-12-01 ENCOUNTER — OFFICE VISIT (OUTPATIENT)
Dept: OTOLARYNGOLOGY | Facility: CLINIC | Age: 73
End: 2023-12-01
Payer: COMMERCIAL

## 2023-12-01 VITALS — SYSTOLIC BLOOD PRESSURE: 172 MMHG | HEART RATE: 73 BPM | DIASTOLIC BLOOD PRESSURE: 110 MMHG

## 2023-12-01 DIAGNOSIS — H90.3 SENSORINEURAL HEARING LOSS (SNHL) OF BOTH EARS: Primary | ICD-10-CM

## 2023-12-01 DIAGNOSIS — H93.13 TINNITUS, BILATERAL: Primary | ICD-10-CM

## 2023-12-01 DIAGNOSIS — H90.3 SENSORINEURAL HEARING LOSS (SNHL) OF BOTH EARS: ICD-10-CM

## 2023-12-01 PROCEDURE — 92557 COMPREHENSIVE HEARING TEST: CPT | Performed by: AUDIOLOGIST

## 2023-12-01 PROCEDURE — 99203 OFFICE O/P NEW LOW 30 MIN: CPT | Performed by: OTOLARYNGOLOGY

## 2023-12-01 PROCEDURE — 92550 TYMPANOMETRY & REFLEX THRESH: CPT | Performed by: AUDIOLOGIST

## 2023-12-01 ASSESSMENT — ENCOUNTER SYMPTOMS
HEADACHES: 0
SINUS PAIN: 0
VOMITING: 0
HEARTBURN: 0
BRUISES/BLEEDS EASILY: 0
COUGH: 0
NAUSEA: 0
CONSTITUTIONAL NEGATIVE: 1
TINGLING: 0
HEMOPTYSIS: 0
EYES NEGATIVE: 1
SORE THROAT: 0
STRIDOR: 0
DIZZINESS: 0
SPUTUM PRODUCTION: 0

## 2023-12-01 NOTE — PROGRESS NOTES
AUDIOLOGY REPORT    SUMMARY: Audiology visit completed. See audiogram for results.    RECOMMENDATIONS: Follow-up with ENT.    Sandie Drew  Doctor of Audiology  [place] License # [id number]    
Back Pain

## 2023-12-01 NOTE — NURSING NOTE
Duane T Nienow's goals for this visit include:   Chief Complaint   Patient presents with    Tinnitus     Patient reports bilateral tinnitus for over 30 years. Patient states that in the morning right after waking up there is a brief time that ringing is not present but then starts.     He requests these members of his care team be copied on today's visit information:     PCP: Srinivasa Berumen    Referring Provider:  Referred Self, MD  No address on file    BP (!) 172/110 (BP Location: Right arm, Patient Position: Sitting, Cuff Size: Adult Regular)   Pulse 73     Do you need any medication refills at today's visit? No  Ada Melendez, [facility]

## 2023-12-01 NOTE — PROGRESS NOTES
[facility]  Duane T Nienow's chief complaint for this visit includes:  Chief Complaint   Patient presents with    Tinnitus     Patient reports bilateral tinnitus for over 30 years. Patient states that in the morning right after waking up there is a brief time that ringing is not present but then starts.     PCP: Srinivasa Berumen    Referring Provider:  Referred Self, MD  No address on file    BP (!) 172/110 (BP Location: Right arm, Patient Position: Sitting, Cuff Size: Adult Regular)   Pulse 73     Pt suspects gradual hearing loss bilaterally. Also reports longstanding tinnitus bilaterally. Hx of noise exposure.  Results: Hearing WNL through 2 kHz sloping to a moderate high freq. SNHL bilaterally. 100% word rec. bilaterally. Tymps WNL. Present 1  kHz ipsi/contra reflexes bilaterally.    This pleasant patient is having tinnitus for years. It is bilateral, high pitches, constant.   Denies any otalgia, otorrhea, aural fullness, dizziness, or vertigo.  No history of pulsatile nature of tinnitus or clicking type.  No history of pulsatile headache, blurry vision, double vision  Patient does not have a history of Migraine, trauma, ototoxic medications, or barotrauma.  Has a history of acoustic trauma and noise exposure.  States that tinnitus may affect patient's sleep and concentration.  Patient has no environmental allergies, nasal congestion, runny nose, sneezing or coughing.  Denies any dysphagia, odynophagia, or hoarseness.      Review of Systems   Constitutional: Negative.    HENT:  Positive for congestion, hearing loss and tinnitus. Negative for ear discharge, ear pain, nosebleeds, sinus pain and sore throat.    Eyes: Negative.    Respiratory:  Negative for cough, hemoptysis, sputum production and stridor.    Gastrointestinal:  Negative for heartburn, nausea and vomiting.   Skin: Negative.    Neurological:  Negative for dizziness, tingling and headaches.   Endo/Heme/Allergies:  Negative for environmental allergies. Does not  bruise/bleed easily.         Physical Exam  Vitals and nursing note reviewed.   Constitutional:       Appearance: Normal appearance.   HENT:      Head: Normocephalic and atraumatic.      Right Ear: Tympanic membrane, ear canal and external ear normal. Decreased hearing noted. No middle ear effusion. There is no impacted cerumen.      Left Ear: Tympanic membrane, ear canal and external ear normal. Decreased hearing noted.  No middle ear effusion. There is no impacted cerumen.      Nose: Mucosal edema present. No congestion.      Right Turbinates: Swollen.      Left Turbinates: Swollen.      Mouth/Throat:      Mouth: Mucous membranes are moist.      Pharynx: Oropharynx is clear. Uvula midline.   Eyes:      Extraocular Movements: Extraocular movements intact.      Pupils: Pupils are equal, round, and reactive to light.   Neurological:      Mental Status: He is alert.       A/P  This pleasant patient is having bilateral tinnitus. The etiologies, prevention and management of the problem were discussed. Patient needs to consider protection from noise exposure, toxic medications. A good hydration, salt and caffeine restriction should be considered. I wanted to assure the patient that tinnitus is harmless no matter how loud it is. We can change the way we react to the sound of tinnitus.

## 2023-12-01 NOTE — LETTER
12/1/2023         RE: Duane T Nienow  [address] [facility] [address]        Dear Colleague,    Thank you for referring your patient, Duane T Nienow, to the [facility]. Please see a copy of my visit note below.    HPI  Duane T Nienow's chief complaint for this visit includes:  Chief Complaint   Patient presents with     Tinnitus     Patient reports bilateral tinnitus for over 30 years. Patient states that in the morning right after waking up there is a brief time that ringing is not present but then starts.     PCP: Srinivasa Berumen    Referring Provider:  Referred Self, MD  No address on file    BP (!) 172/110 (BP Location: Right arm, Patient Position: Sitting, Cuff Size: Adult Regular)   Pulse 73     Pt suspects gradual hearing loss bilaterally. Also reports longstanding tinnitus bilaterally. Hx of noise exposure.  Results: Hearing WNL through 2 kHz sloping to a moderate high freq. SNHL bilaterally. 100% word rec. bilaterally. Tymps WNL. Present 1  kHz ipsi/contra reflexes bilaterally.    This pleasant patient is having tinnitus for years. It is bilateral, high pitches, constant.   Denies any otalgia, otorrhea, aural fullness, dizziness, or vertigo.  No history of pulsatile nature of tinnitus or clicking type.  No history of pulsatile headache, blurry vision, double vision  Patient does not have a history of Migraine, trauma, ototoxic medications, or barotrauma.  Has a history of acoustic trauma and noise exposure.  States that tinnitus may affect patient's sleep and concentration.  Patient has no environmental allergies, nasal congestion, runny nose, sneezing or coughing.  Denies any dysphagia, odynophagia, or hoarseness.      Review of Systems   Constitutional: Negative.    HENT:  Positive for congestion, hearing loss and tinnitus. Negative for ear discharge, ear pain, nosebleeds, sinus pain and sore throat.    Eyes: Negative.    Respiratory:  Negative for cough,  hemoptysis, sputum production and stridor.    Gastrointestinal:  Negative for heartburn, nausea and vomiting.   Skin: Negative.    Neurological:  Negative for dizziness, tingling and headaches.   Endo/Heme/Allergies:  Negative for environmental allergies. Does not bruise/bleed easily.         Physical Exam  Vitals and nursing note reviewed.   Constitutional:       Appearance: Normal appearance.   HENT:      Head: Normocephalic and atraumatic.      Right Ear: Tympanic membrane, ear canal and external ear normal. Decreased hearing noted. No middle ear effusion. There is no impacted cerumen.      Left Ear: Tympanic membrane, ear canal and external ear normal. Decreased hearing noted.  No middle ear effusion. There is no impacted cerumen.      Nose: Mucosal edema present. No congestion.      Right Turbinates: Swollen.      Left Turbinates: Swollen.      Mouth/Throat:      Mouth: Mucous membranes are moist.      Pharynx: Oropharynx is clear. Uvula midline.   Eyes:      Extraocular Movements: Extraocular movements intact.      Pupils: Pupils are equal, round, and reactive to light.   Neurological:      Mental Status: He is alert.       A/P  This pleasant patient is having bilateral tinnitus. The etiologies, prevention and management of the problem were discussed. Patient needs to consider protection from noise exposure, toxic medications. A good hydration, salt and caffeine restriction should be considered. I wanted to assure the patient that tinnitus is harmless no matter how loud it is. We can change the way we react to the sound of tinnitus.      Again, thank you for allowing me to participate in the care of your patient.        Sincerely,        Mima Locke MD

## 2024-07-11 ENCOUNTER — TELEPHONE (OUTPATIENT)
Dept: DERMATOLOGY | Facility: CLINIC | Age: 74
End: 2024-07-11
Payer: COMMERCIAL

## 2024-07-11 NOTE — TELEPHONE ENCOUNTER
[facility] Call Center    Phone Message    May a detailed message be left on voicemail: yes     Reason for Call: Symptoms or Concerns     If patient has red-flag symptoms, warm transfer to triage line    Current symptom or concern: 9-10 bumps with blisters around on the scalp    Symptoms have been present for:  1 week(s)    Has patient previously been seen for this? No    By : NA    Date: NA    Are there any new or worsening symptoms? Yes: please call Elinor back to discuss. Pt is concerned and not getting hair cut because afraid it might cut and cause issues. Thanks     Action Taken: Message routed to:  Adult Clinics: Dermatology p [id number]    Travel Screening: Not Applicable     Date of Service:

## 2024-07-11 NOTE — TELEPHONE ENCOUNTER
Writer called pt, no answer. Left message for pt to return our call at [phone].       Add on telephone visit with Christian for today or tomorrow. Need photos to assess. Rule out shingles.    Mya Nava RN on 7/11/2024 at 8:47 AM

## 2024-07-12 NOTE — TELEPHONE ENCOUNTER
Duane returned our call to schedule. I informed him that we do not have any in person appointments but I could get him scheduled for a telephone visit if he is able to send photos. I scheduled pt for a telephone appointment with Dr. Christian on Monday at 11. Pt provided with derm email.     Pura Jose RN on 7/12/2024 at 1:34 PM

## 2024-07-12 NOTE — PROGRESS NOTES
[facility] Dermatology Note  Encounter Date: Jul 15, 2024  Store-and-Forward and Telephone ([phone]). Location of teledermatologist: [facility].  Start time: 11:43 am. End time: 11:47 am.    Dermatology Problem List:  Last skin check: 5/31/2023  1. AK - cryo  2. Lesion to monitor-7x3mm flesh colored shiny lesion-vertex scalp - photo 5/31/23    ____________________________________________    Assessment & Plan:     # Papules and plaques, most consistent with SKs and nevi.  - Have checked at next in person with MEL Strauss scheduled for 10/9/24          Procedures Performed:    None    Follow-up: at previously scheduled appt in 10/2024 with Haylee Strauss.     Staff and Scribe:   Scribe Disclosure:   I, Agata Hyde, am serving as a scribe to document services personally performed by Soo Christian MD based on data collection and the provider's statements to me.     Provider Disclosure:   The documentation recorded by the scribe accurately reflects the services I personally performed and the decisions made by me.    Soo Christian MD    Department of Dermatology  [facility]: Phone: [phone], Fax:[phone]  [facility]: Phone: [phone], Fax: [phone]   ____________________________________________    CC: Derm Problem (Blisters on scalp. He believes he had has had them for about a month. He reports that it is itchy, not painful, the blisters are in different spots. )    HPI:  Mr. Duane T Nienow is a(n) 73 year old male who presents today as a return patient for derm problem.    Today, patient reports blisters on scalp. Has had them for about a month, but some areas longer, up to a year. Reports itching but not pain.    Patient is otherwise feeling well, without additional skin concerns.    Labs Reviewed:  N/A    Physical Exam:  Vitals:  There were no vitals taken for this visit.  SKIN: Teledermatology photos were reviewed; image quality and interpretability: limited and reviewed with patient. Image date: 7/15/24 .  - Multiple spots on scalp, concentrated on head  - No other lesions of concern on areas examined.     Medications:  Current Outpatient Medications   Medication Sig Dispense Refill    bisacodyl (DULCOLAX) 5 MG EC tablet Take 2 tablets at 3 pm the day before your procedure. If your procedure is before 11 am, take 2 additional tablets at 11 pm. If your procedure is after 11 am, take 2 additional tablets at 6 am. For additional instructions refer to your colonoscopy prep instructions. 4 tablet 0    Multiple Vitamin (MULTI-VITAMIN DAILY PO) Take by mouth daily      amLODIPine (NORVASC) 5 MG tablet Take 1 tablet (5 mg) by mouth 2 times daily (Patient not taking: Reported on 5/31/2023) 180 tablet 3    polyethylene glycol (GOLYTELY) 236 g suspension The night before the exam at 6 pm drink an 8-ounce glass every 15 minutes until the jug is half empty. If you arrive before 11 AM: Drink the other half of the Golytely jug at 11 PM night before procedure. If you arrive after 11 AM: Drink the other half of the Golytely jug at 6 AM day of procedure. For additional instructions refer to your colonoscopy prep instructions. (Patient not taking: Reported on 5/31/2023) 4000 mL 0     No current facility-administered medications for this visit.      Past Medical/Surgical History:   Patient Active Problem List   Diagnosis    Fracture of metatarsal of left foot, closed    Snoring    Daytime somnolence    Elevated PSA    Essential hypertension with goal blood pressure less than 140/90    Dyslipidemia    SK (seborrheic keratosis)     Past Medical History:   Diagnosis Date    Hypertension        CC No referring provider defined for this encounter. on close of this encounter.

## 2024-07-15 ENCOUNTER — VIRTUAL VISIT (OUTPATIENT)
Dept: DERMATOLOGY | Facility: CLINIC | Age: 74
End: 2024-07-15
Payer: COMMERCIAL

## 2024-07-15 DIAGNOSIS — R23.8 PAPULE: Primary | ICD-10-CM

## 2024-07-15 PROCEDURE — 99207 PR NO BILLABLE SERVICE THIS VISIT: CPT | Performed by: DERMATOLOGY

## 2024-07-15 ASSESSMENT — PAIN SCALES - GENERAL: PAINLEVEL: NO PAIN (0)

## 2024-07-15 NOTE — LETTER
7/15/2024      Duane T Nienow  [address] [facility] [address]      Dear Colleague,    Thank you for referring your patient, Duane T Nienow, to the [facility]. Please see a copy of my visit note below.      [facility] Dermatology Note  Encounter Date: Jul 15, 2024  Store-and-Forward and Telephone ([phone]). Location of teledermatologist: [facility].  Start time: 11:43 am. End time: 11:47 am.    Dermatology Problem List:  Last skin check: 5/31/2023  1. AK - cryo  2. Lesion to monitor-7x3mm flesh colored shiny lesion-vertex scalp - photo 5/31/23    ____________________________________________    Assessment & Plan:     # Papules and plaques, most consistent with SKs and nevi.  - Have checked at next in person with MEL Strauss scheduled for 10/9/24          Procedures Performed:    None    Follow-up: at previously scheduled appt in 10/2024 with Haylee Strauss.     Staff and Scribe:   Scribe Disclosure:   I, Agata Hyde, am serving as a scribe to document services personally performed by Soo Christian MD based on data collection and the provider's statements to me.     Provider Disclosure:   The documentation recorded by the scribe accurately reflects the services I personally performed and the decisions made by me.    Soo Christian MD    Department of Dermatology  [facility]: Phone: [phone], Fax:[phone]  [facility]: Phone: [phone], Fax: [phone]   ____________________________________________    CC: Derm Problem (Blisters on scalp. He believes he had has had them for about a month. He reports that it is itchy, not painful, the blisters are in different spots. )    HPI:  Mr. Duane T Nienow is a(n) 73 year old male who presents today as a return patient for derm problem.    Today, patient reports  blisters on scalp. Has had them for about a month, but some areas longer, up to a year. Reports itching but not pain.    Patient is otherwise feeling well, without additional skin concerns.    Labs Reviewed:  N/A    Physical Exam:  Vitals: There were no vitals taken for this visit.  SKIN: Teledermatology photos were reviewed; image quality and interpretability: limited and reviewed with patient. Image date: 7/15/24 .  - Multiple spots on scalp, concentrated on head  - No other lesions of concern on areas examined.     Medications:  Current Outpatient Medications   Medication Sig Dispense Refill     bisacodyl (DULCOLAX) 5 MG EC tablet Take 2 tablets at 3 pm the day before your procedure. If your procedure is before 11 am, take 2 additional tablets at 11 pm. If your procedure is after 11 am, take 2 additional tablets at 6 am. For additional instructions refer to your colonoscopy prep instructions. 4 tablet 0     Multiple Vitamin (MULTI-VITAMIN DAILY PO) Take by mouth daily       amLODIPine (NORVASC) 5 MG tablet Take 1 tablet (5 mg) by mouth 2 times daily (Patient not taking: Reported on 5/31/2023) 180 tablet 3     polyethylene glycol (GOLYTELY) 236 g suspension The night before the exam at 6 pm drink an 8-ounce glass every 15 minutes until the jug is half empty. If you arrive before 11 AM: Drink the other half of the Golytely jug at 11 PM night before procedure. If you arrive after 11 AM: Drink the other half of the Golytely jug at 6 AM day of procedure. For additional instructions refer to your colonoscopy prep instructions. (Patient not taking: Reported on 5/31/2023) 4000 mL 0     No current facility-administered medications for this visit.      Past Medical/Surgical History:   Patient Active Problem List   Diagnosis     Fracture of metatarsal of left foot, closed     Snoring     Daytime somnolence     Elevated PSA     Essential hypertension with goal blood pressure less than 140/90     Dyslipidemia     SK  (seborrheic keratosis)     Past Medical History:   Diagnosis Date     Hypertension        CC No referring provider defined for this encounter. on close of this encounter.     Teledermatology Nurse Call Patients:     Are you in the [facility] at the time of the encounter? yes    Today's visit will be billed to you and your insurance.    A teledermatology visit is not as thorough as an in-person visit and the quality of the photograph sent may not be of the same quality as that taken by the dermatology clinic.    Julia Franco LPN      Again, thank you for allowing me to participate in the care of your patient.        Sincerely,        Soo Christian MD

## 2024-07-15 NOTE — PATIENT INSTRUCTIONS
[facility] Dermatology Visit    Thank you for allowing us to participate in your care. Your findings, instructions and follow-up plan are as follows:         When should I call my doctor?  If you are worsening or not improving, please, contact us or seek urgent care as noted below.     Who should I call with questions (adults)?  [facility] (adult and pediatric): [phone]  [facility] (adult): [phone]  For urgent needs outside of business hours call the [facility] at [phone] and ask for the dermatology resident on call  If this is a medical emergency and you are unable to reach an ER, Call 911    Who should I call with questions (pediatric)?  [facility]- Pediatric Dermatology  Dr. Esther Akhtar, Dr. Luciano Cooper, Dr. Linda Randall, Steff Jimenez, PA  Dr. Ethel Cook, Dr. Grace Joyner & Dr. Nimesh Dimas  Non Urgent  Nurse Triage Line; [phone]- Abbie and Cristy RN Care Coordinators   [place] (/Complex ) [phone]    If you need a prescription refill, please contact your pharmacy. Refills are approved or denied by our physicians during normal business hours, Monday through Fridays  Per office policy, refills will not be granted if you have not been seen within the past year (or sooner depending on your child's condition).    Scheduling Information:  Pediatric Appointment Scheduling and Call Center [phone]  Radiology Scheduling- [phone]  Sedation Unit Scheduling- [phone]  [facility] Scheduling- General [phone]; Pediatric Dermatology [phone]  Main  Services: [phone]  Salvadorean: [phone]  Kosovan: [phone]  Hmong/Atul/Italian: [phone]  Preadmission Nursing Department Fax Number: [phone] (fax all pre-operative paperwork to this number)    For urgent matters arising during evenings, weekends, or  holidays that cannot wait for normal business hours please call [phone] and ask for the dermatology resident on call to be paged.

## 2024-07-15 NOTE — PROGRESS NOTES
Teledermatology Nurse Call Patients:     Are you in the [facility] at the time of the encounter? yes    Today's visit will be billed to you and your insurance.    A teledermatology visit is not as thorough as an in-person visit and the quality of the photograph sent may not be of the same quality as that taken by the dermatology clinic.    Julia Franco LPN

## (undated) DEVICE — KIT ENDO FIRST STEP DISINFECTANT 200ML W/POUCH EP-4

## (undated) DEVICE — SOL WATER IRRIG 1000ML BOTTLE 07139-09

## (undated) DEVICE — PAD CHUX UNDERPAD 23X24" 7136

## (undated) RX ORDER — FENTANYL CITRATE 50 UG/ML
INJECTION, SOLUTION INTRAMUSCULAR; INTRAVENOUS
Status: DISPENSED
Start: 2017-11-07

## (undated) RX ORDER — PROPOFOL 10 MG/ML
INJECTION, EMULSION INTRAVENOUS
Status: DISPENSED
Start: 2023-01-30